# Patient Record
Sex: FEMALE
[De-identification: names, ages, dates, MRNs, and addresses within clinical notes are randomized per-mention and may not be internally consistent; named-entity substitution may affect disease eponyms.]

---

## 2021-07-19 PROBLEM — Z00.00 ENCOUNTER FOR PREVENTIVE HEALTH EXAMINATION: Status: ACTIVE | Noted: 2021-07-19

## 2021-07-28 ENCOUNTER — APPOINTMENT (OUTPATIENT)
Dept: NEUROLOGY | Facility: CLINIC | Age: 86
End: 2021-07-28

## 2021-09-15 ENCOUNTER — NON-APPOINTMENT (OUTPATIENT)
Age: 86
End: 2021-09-15

## 2021-09-15 ENCOUNTER — APPOINTMENT (OUTPATIENT)
Dept: NEUROLOGY | Facility: CLINIC | Age: 86
End: 2021-09-15
Payer: MEDICARE

## 2021-09-15 VITALS
HEART RATE: 70 BPM | DIASTOLIC BLOOD PRESSURE: 74 MMHG | SYSTOLIC BLOOD PRESSURE: 130 MMHG | TEMPERATURE: 97.9 F | HEIGHT: 57 IN | RESPIRATION RATE: 14 BRPM | BODY MASS INDEX: 23.08 KG/M2 | WEIGHT: 107 LBS | OXYGEN SATURATION: 95 %

## 2021-09-15 DIAGNOSIS — Z80.9 FAMILY HISTORY OF MALIGNANT NEOPLASM, UNSPECIFIED: ICD-10-CM

## 2021-09-15 DIAGNOSIS — Z80.0 FAMILY HISTORY OF MALIGNANT NEOPLASM OF DIGESTIVE ORGANS: ICD-10-CM

## 2021-09-15 DIAGNOSIS — Z86.79 PERSONAL HISTORY OF OTHER DISEASES OF THE CIRCULATORY SYSTEM: ICD-10-CM

## 2021-09-15 DIAGNOSIS — Z86.39 PERSONAL HISTORY OF OTHER ENDOCRINE, NUTRITIONAL AND METABOLIC DISEASE: ICD-10-CM

## 2021-09-15 DIAGNOSIS — Z86.73 PERSONAL HISTORY OF TRANSIENT ISCHEMIC ATTACK (TIA), AND CEREBRAL INFARCTION W/OUT RESIDUAL DEFICITS: ICD-10-CM

## 2021-09-15 DIAGNOSIS — I63.9 CEREBRAL INFARCTION, UNSPECIFIED: ICD-10-CM

## 2021-09-15 DIAGNOSIS — R52 PAIN, UNSPECIFIED: ICD-10-CM

## 2021-09-15 DIAGNOSIS — Z87.828 PERSONAL HISTORY OF OTHER (HEALED) PHYSICAL INJURY AND TRAUMA: ICD-10-CM

## 2021-09-15 DIAGNOSIS — Z63.4 DISAPPEARANCE AND DEATH OF FAMILY MEMBER: ICD-10-CM

## 2021-09-15 PROCEDURE — 99204 OFFICE O/P NEW MOD 45 MIN: CPT

## 2021-09-15 RX ORDER — ATORVASTATIN CALCIUM 10 MG/1
10 TABLET, FILM COATED ORAL DAILY
Refills: 0 | Status: ACTIVE | COMMUNITY

## 2021-09-15 RX ORDER — BRIMONIDINE TARTRATE 2 MG/MG
0.2 SOLUTION/ DROPS OPHTHALMIC
Refills: 0 | Status: ACTIVE | COMMUNITY

## 2021-09-15 RX ORDER — LISINOPRIL 40 MG/1
40 TABLET ORAL DAILY
Refills: 0 | Status: ACTIVE | COMMUNITY

## 2021-09-15 RX ORDER — ASPIRIN 81 MG
81 TABLET, DELAYED RELEASE (ENTERIC COATED) ORAL DAILY
Refills: 0 | Status: ACTIVE | COMMUNITY

## 2021-09-15 RX ORDER — LATANOPROST/PF 0.005 %
0.01 DROPS OPHTHALMIC (EYE)
Refills: 0 | Status: ACTIVE | COMMUNITY

## 2021-09-15 SDOH — SOCIAL STABILITY - SOCIAL INSECURITY: DISSAPEARANCE AND DEATH OF FAMILY MEMBER: Z63.4

## 2021-09-15 NOTE — PHYSICAL EXAM
[FreeTextEntry1] : Physical Exam\par Constitutional: no apparent distress\par Psychiatric: normal affect, euthymic, alert and oriented x 3\par MSK: no midline or paraspinal tenderness, no tenderness to palpation of left ribs\par \par Neurologic Exam:\par Mental Status: awake and alert, speech fluent and prosodic with no paraphasic errors\par Cranial Nerves: I: deferred; II: pupils equal round and reactive, visual fields full to confrontation, III, IV, VI: extraocular movements full with no nystagmus; V: facial sensation intact and symmetric; VII: facial power symmetric; VIII: hearing intact to finger rub; IX/X: palate elevates symmetrically, no dysarthria; XI: shoulder shrug symmetric; XII: tongue protrudes midline\par Motor: normal bulk and tone, no orbiting or pronator drift, power 5/5 to confrontation throughout including shoulder abduction, elbow flexion and extension, wrist flexion and extension, hip flexion, knee flexion and extension, no abnormal movements\par Sensation: intact to light touch in distal upper and lower extremities bilaterally\par Coordination: finger-nose-finger intact bilaterally\par Reflexes: 2+ biceps, triceps, brachioradialis, patella, slightly brisker on L\par Gait: narrow base, normal stance and stride, normal arm swing, normal tandem, negative Romberg\par

## 2021-09-15 NOTE — HISTORY OF PRESENT ILLNESS
[FreeTextEntry1] : Presents with daughter for initial visit for stroke and left-sided pain.\par \par 12/31/2017 she was at home, vision went dark, passed out, brought to [place] and was found to have R MCA stroke c/b hemorrhage.  Patient and daughter are unsure of the work up but they were told that it was likely caused by a heart problem.  Loop recorder was placed.  In December 2020 she was getting out of bed, felt lightheaded, fell and hit the back of her head on the floor.  Again went to the ED and had a head CT which showed no hemorrhage and a C spine CT which showed multilevel spondylosis but no acute changes.  Per patient the loop recorder has not shown any events.\par \par A few months after discharge from the second admission she developed severe pain in her left shoulder extending down her arm and into the right side of her torso.  Pain gradually improved and is now absent at rest but  she has a sharp stabbing pain in the left side of her torso when she coughs.  Saw cardiology and was told it was not a heart problem, now concerned it may be a neurologic problem.\par \par

## 2021-09-15 NOTE — CONSULT LETTER
[Dear  ___] : Dear  [unfilled], [Consult Letter:] : I had the pleasure of evaluating your patient, [unfilled]. [Please see my note below.] : Please see my note below. [Consult Closing:] : Thank you very much for allowing me to participate in the care of this patient.  If you have any questions, please do not hesitate to contact me. [Sincerely,] : Sincerely, [___] : [unfilled] [FreeTextEntry2] : Erick Valente MD [FreeTextEntry3] : Leann Delatorre MD

## 2021-09-15 NOTE — REASON FOR VISIT
[Initial Evaluation] : an initial evaluation [Family Member] : family member [FreeTextEntry1] : stroke, left-sided pain

## 2021-09-15 NOTE — ASSESSMENT
[FreeTextEntry1] : 1) Remote R MCA stroke\par -Asked patient and daughter to send copies of prior work up\par -Will obtain carotid ultrasound given recurrent lightheadedness/dizziness and unknown vessel status\par -Continue following with cardiology for loop recorder monitoring\par -Continue aspirin\par -Continue statin, LDL goal < 70\par -Continue antihypertensives\par \par 2) Left torso pain when coughing\par -I cannot think of any potential neurologic cause of this symptom.  Neurologic exam is normal.  Post-stroke pain has been noted with thalamic and other infarcts but this would not fluctuate with increases in intrathoracic pressure.  Recommend PCP evaluation.

## 2021-10-10 ENCOUNTER — APPOINTMENT (OUTPATIENT)
Dept: ULTRASOUND IMAGING | Facility: CLINIC | Age: 86
End: 2021-10-10
Payer: MEDICARE

## 2021-10-10 ENCOUNTER — OUTPATIENT (OUTPATIENT)
Dept: OUTPATIENT SERVICES | Facility: HOSPITAL | Age: 86
LOS: 1 days | End: 2021-10-10

## 2021-10-10 ENCOUNTER — RESULT REVIEW (OUTPATIENT)
Age: 86
End: 2021-10-10

## 2021-10-10 PROCEDURE — 93880 EXTRACRANIAL BILAT STUDY: CPT | Mod: 26

## 2022-05-31 ENCOUNTER — APPOINTMENT (OUTPATIENT)
Dept: ENDOCRINOLOGY | Facility: CLINIC | Age: 87
End: 2022-05-31

## 2022-09-08 ENCOUNTER — APPOINTMENT (OUTPATIENT)
Dept: ENDOCRINOLOGY | Facility: CLINIC | Age: 87
End: 2022-09-08

## 2022-09-08 VITALS
HEIGHT: 55 IN | WEIGHT: 101 LBS | BODY MASS INDEX: 23.37 KG/M2 | SYSTOLIC BLOOD PRESSURE: 148 MMHG | DIASTOLIC BLOOD PRESSURE: 83 MMHG | HEART RATE: 88 BPM

## 2022-09-08 VITALS — HEIGHT: 56 IN

## 2022-09-08 PROCEDURE — 36415 COLL VENOUS BLD VENIPUNCTURE: CPT

## 2022-09-08 PROCEDURE — 99205 OFFICE O/P NEW HI 60 MIN: CPT | Mod: 25

## 2022-09-09 LAB
T3 SERPL-MCNC: 132 NG/DL
T4 FREE SERPL-MCNC: 1.3 NG/DL
T4 SERPL-MCNC: 9.2 UG/DL
TSH SERPL-ACNC: 0.02 UIU/ML

## 2022-09-12 LAB
THYROGLOB AB SERPL-ACNC: <20 IU/ML
THYROPEROXIDASE AB SERPL IA-ACNC: 45.6 IU/ML
TSH RECEPTOR AB: <1.1 IU/L
TSI ACT/NOR SER: <0.1 IU/L

## 2022-09-30 ENCOUNTER — OUTPATIENT (OUTPATIENT)
Dept: OUTPATIENT SERVICES | Facility: HOSPITAL | Age: 87
LOS: 1 days | End: 2022-09-30

## 2022-09-30 ENCOUNTER — APPOINTMENT (OUTPATIENT)
Dept: RADIOLOGY | Facility: CLINIC | Age: 87
End: 2022-09-30

## 2022-10-03 ENCOUNTER — RESULT REVIEW (OUTPATIENT)
Age: 87
End: 2022-10-03

## 2022-10-03 PROCEDURE — 77085 DXA BONE DENSITY AXL VRT FX: CPT | Mod: 26

## 2022-10-03 NOTE — PHYSICAL EXAM
[Alert] : alert [Healthy Appearance] : healthy appearance [No Acute Distress] : no acute distress [Normal Sclera/Conjunctiva] : normal sclera/conjunctiva [Normal Hearing] : hearing was normal [No Neck Mass] : no neck mass was observed [No LAD] : no lymphadenopathy [Supple] : the neck was supple [Thyroid Not Enlarged] : the thyroid was not enlarged [No Respiratory Distress] : no respiratory distress [Clear to Auscultation] : lungs were clear to auscultation bilaterally [Normal S1, S2] : normal S1 and S2 [Normal Rate] : heart rate was normal [Regular Rhythm] : with a regular rhythm [No Stigmata of Cushings Syndrome] : no stigmata of Cushings Syndrome [Normal Gait] : normal gait [Normal Insight/Judgement] : insight and judgment were intact [Kyphosis] : no kyphosis present [Acanthosis Nigricans] : no acanthosis nigricans [de-identified] : approximately 2 cm left midpole nodule, mobile [de-identified] : no moon facies, no supraclavicular fat pads

## 2022-10-03 NOTE — ADDENDUM
[FreeTextEntry1] : Recent laboratory results as below; discussed with Ms. Rendon and her granddaughter, Leyla. TSH 0.02 uIU/mL with T4/T3 within range. TSH receptor antibody and thyroid stimulating immunoglobulin negative. Thyroid peroxidase antibody positive. I recommended starting methimazole 2.5 mg daily for subclinical hyperthyroidism, with plan to repeat thyroid function tests in 12 weeks. We discussed the potential risks and benefits of methimazole, including but not limited to rash, agranulocytosis, and liver failure. The patient will call me immediately with any concerning symptoms, including rash, fever, sore throat, right upper quadrant pain, jaundice, scleral icterus. 9/12/22\par \par Recent bone density testing as below; discussed with Ms. Rendon. Bone density significant for osteoporosis at the femoral neck and vertebral fracture analysis demonstrated a vertebral fracture at L1. We will schedule an appointment to discuss therapy. 10/03/22\par \par Most recent bone mineral density\par Date: August 26, 2022\par Source: [employer]\par Site: [facility]\par \par Site	BMD	T-score	Change previous	Change baseline\par Lumbar spine	0.926	-1.1		\par Femoral neck	0.563	-2.6		\par Total hip                  	0.785	-1.3		\par Distal radius	0.553	-2.4		\par DXA comments: \par Vertebral fracture assessment: T5 to L5 visualized. Vertebral compression fracture noted at L1 (my read).\par \par Impression: I have personally reviewed the DXA images and report. There is osteoporosis at the femoral neck. Vertebral fracture analysis demonstrates a vertebral fracture at L1.

## 2022-10-03 NOTE — ASSESSMENT
[FreeTextEntry1] : Low thyroid stimulating hormone level. TSH may be low due to nonthyroidal illness, thyroiditis, Graves' disease, or toxic nodule. We will send a thyroid function panel; thyroid peroxidase, thyroglobulin, and TSH receptor antibodies now. Further evaluation and management pending results. \par \par Thyroid nodules. We discussed that approximately 95 percent of all thyroid nodules are caused by benign conditions. We discussed that thyroid nodules are very common and less likely to be cancer in older individuals. We discussed the risks and benefits of evaluation for thyroid cancer and morbidity of treatment if diagnosed versus overall prognosis of thyroid cancer. We will defer further thyroid ultrasound screening.\par \par Choking sensation. I referred her to otorhinolaryngology providers within the [facility] system. \par \par Screening for osteoporosis. She has no personal history of fracture and no parental history of hip fracture. We will obtain dual energy X-ray absorptiometry. \par \par Granddaughter, Leyla, [phone]\par \par CC:\par Dr. Vel Lanier, Fax [phone]

## 2022-10-03 NOTE — DATA REVIEWED
[FreeTextEntry1] : \par \par Thyroid ultrasound (March 22, 2022) reviewed and significant for:\par ISTHMUS: Normal size in AP dimension measuring 0.2 cm.\par RIGHT LOBE:  Measures 3.1 x 0.9 x 1.0 cm in sagittal x AP x transverse dimensions.  Volume 1.5 cc.\par ARCHITECTURE: Heterogeneous.. \par COLOR DOPPLER:  Unremarkable.\par RIGHT LOBE NODULES: \par 0.3 x 0.2 x 0.3 cm hypoechoic upper pole nodule previously measured 0.3 x 0.2 x 0.3 cm.\par LEFT LOBE:    Measures 3.5 x 1.6 x 1.9 cm in sagittal x AP x transverse dimensions. Volume 5.3 cc.\par ARCHITECTURE: Heterogeneous.. \par COLOR DOPPLER:  Unremarkable.\par LEFT LOBE NODULES:\par The previously biopsied 2.4 x 1.5 x 1.6 cm heterogeneous mid pole solid nodule with microcalcification measured 2.6 x 1.4 x 1.6 cm on the prior study.\par NECK:\par 2.1 x 0.5 x 1.0 cm abnormal-appearing heterogeneous level 3 lymph node right neck with echogenic foci previously measured 2.4 x 0.5 x 1.1 cm.\par 1.5 x 0.9 x 1.5 cm level 2 lymph node left neck.\par 1.8 x 0.8 x 1.5 cm abnormal appearing level 3 lymph node left neck with echogenic foci previously measured 2.0 x 0.8 x 1.4 cm.\par IMPRESSION:  \par Thyroid nodules without significant interval change.\par Multiple abnormal appearing cervical lymph nodes without significant interval change.

## 2022-10-03 NOTE — HISTORY OF PRESENT ILLNESS
[FreeTextEntry1] : Ms. Rendon is an 89 year-old woman with a history of multiple medical problems including cerebrovascular accident presenting for evaluation of low thyroid stimulating hormone and thyroid nodules. She is accompanied by her daughter in person and granddaughter by telephone. \par \par Low thyroid stimulating hormone. Thyroid nodules.\par She was noted to have low thyroid stimulating hormone levels of 0.01 uIU/mL (normal: 0.40-4.50) in February and March 2022.\par No known previous history of thyroid hormone abnormalities. No history of thyroid medication use.\par Thyroid ultrasound in January 2021 demonstrated a left midpole 2.6 x 1.4 x 1.6 cm nodule with calcification and a right upper pole subcentimeter nodule. Biopsy of the left midpole nodule in February 2021 with benign ([place] II) cytopathology. \par Thyroid ultrasound in March 2022 overall stable from previous.\par No history of radiation exposure.\par Daughter with history of goiter status post thyroidectomy with benign pathology.\par \par She has an occasional choking sensation that causes a nonproductive cough. No dysphagia for liquids or solids. No palpitations. Review of systems otherwise negative.

## 2022-10-06 ENCOUNTER — APPOINTMENT (OUTPATIENT)
Dept: NEPHROLOGY | Facility: CLINIC | Age: 87
End: 2022-10-06

## 2022-10-06 PROCEDURE — 99204 OFFICE O/P NEW MOD 45 MIN: CPT | Mod: 25

## 2022-10-06 PROCEDURE — 36415 COLL VENOUS BLD VENIPUNCTURE: CPT

## 2022-10-06 NOTE — ASSESSMENT
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 However, even if present, MITUL may be an incidental finding and benefits of revascularization do not clearly outweigh risks (based on recent studies).\par * Therapies for kidney disease: blood pressure control; proteinuria reduction with ARB/ACEi; other evidence-based therapies including exercise, a plant-based lower oxalate diet, and 400 mcg folic acid daily\par * Cardiovascular disease prevention: counseling on healthy diet, physical activity, weight loss, alcohol limitation, blood pressure control\par * A counseling information sheet on CKD which they have been instructed to read has been given.\par * The patient has been counseled that chronic kidney disease is a significant condition and regular office follow-up with me (at least every 2 months for now) is important for monitoring and their health, and that it is their responsibility to make a follow-up appointment.\par * The patient has been counseled never to stop taking their medications without discussing it with me or another doctor.\par * The patient has been counseled on avoiding NSAIDs.\par * The patient has been counseled on risk of worsening kidney function and instructed to immediately call and speak with me and go immediately to ER with any severe symptoms, nausea, vomiting, diarrhea, chest pain, or shortness of breath.\par \par # HTN possibly uncontrolled.\par * Continue lisinopril and HCTZ.\par * If BP uncontrolled will consider changing HCTZ to chlorthaldione.\par * The patient's blood pressure was checked with the [employer] HEM-907XL using the SPRINT trial protocol after sitting quietly in an empty room with arm supported, back supported, and feet on the floor for 5 minutes. The average of 3 readings were taken.\par * A counseling information sheet on blood pressure and staying healthy which they have been instructed to read has been given.\par * The patient has been counseled to check their BP at home with an automatic arm cuff, write down the readings, and reach me directly on the phone immediately if they are persistently > 180 systolic or if SBP is less than 100 or if lightheadedness develops. They were counseled to bring in all blood pressure readings and medications next visit.\par * The patient has been counseled that regular office follow-up (at least every 2 months for now)  is important for monitoring and for their health, and that it is their responsibility to make follow up appointments.\par * The patient also has been counseled that they must never stop or change any medications without discussing this with me (or another physician). \par

## 2022-10-06 NOTE — HISTORY OF PRESENT ILLNESS
[FreeTextEntry1] : Kindly referred by Dr. Lanier for CKD. She is here with her granddaughter who was also provided counseling. \par \par Labs from [facility] reviewed. Creatinine 1.27 on 9/15 (eGFR 40). Prevously creatinine has been 1.2 - 1.3 since 2021. * Ultrasound showedd R 8.8 and L atrophic and not well visualized. 12 mg albuminuria and no hematuria on U/A.\par \par HTN treated with HCTZ 12.5 and lisinopril 40. * Doesn’t check BP at home. No lightheadedness. No CP/SOB. Compliant with medications. \par \par

## 2022-10-06 NOTE — PHYSICAL EXAM
[General Appearance - Alert] : alert [General Appearance - In No Acute Distress] : in no acute distress [Neck Appearance] : the appearance of the neck was normal [Neck Cervical Mass (___cm)] : no neck mass was observed [Jugular Venous Distention Increased] : there was no jugular-venous distention [Thyroid Diffuse Enlargement] : the thyroid was not enlarged [Thyroid Nodule] : there were no palpable thyroid nodules [Auscultation Breath Sounds / Voice Sounds] : lungs were clear to auscultation bilaterally [Heart Rate And Rhythm] : heart rate was normal and rhythm regular [Heart Sounds] : normal S1 and S2 [Heart Sounds Gallop] : no gallops [Murmurs] : no murmurs [Heart Sounds Pericardial Friction Rub] : no pericardial rub [Bowel Sounds] : normal bowel sounds [Abdomen Soft] : soft [Abdomen Tenderness] : non-tender [Abdomen Mass (___ Cm)] : no abdominal mass palpated [] : no hepato-splenomegaly [Cervical Lymph Nodes Enlarged Posterior Bilaterally] : posterior cervical [Cervical Lymph Nodes Enlarged Anterior Bilaterally] : anterior cervical [Supraclavicular Lymph Nodes Enlarged Bilaterally] : supraclavicular [Axillary Lymph Nodes Enlarged Bilaterally] : axillary [Femoral Lymph Nodes Enlarged Bilaterally] : femoral [Inguinal Lymph Nodes Enlarged Bilaterally] : inguinal [FreeTextEntry1] : trace ankle edema

## 2022-10-06 NOTE — CONSULT LETTER
[FreeTextEntry1] : Dear Vel,\par \par I had the pleasure of seeing Kecia Rendon in consultation for kidney disease. My note is attached.\par \par Thank you for the opportunity to participate in the care of your patient.\par \par Please call me on my mobile phone at [phone] or in the office at [phone] if you have any questions.\par \par Best regards,\par \par Bradford\par \par Bradford Wynne MD, FACP, FASN\par [address], [place]\par [email or web address]\par Office: [phone]\par Mobile: [phone]

## 2022-10-25 ENCOUNTER — NON-APPOINTMENT (OUTPATIENT)
Age: 87
End: 2022-10-25

## 2022-10-25 ENCOUNTER — LABORATORY RESULT (OUTPATIENT)
Age: 87
End: 2022-10-25

## 2022-10-25 ENCOUNTER — APPOINTMENT (OUTPATIENT)
Dept: ENDOCRINOLOGY | Facility: CLINIC | Age: 87
End: 2022-10-25

## 2022-10-25 VITALS
BODY MASS INDEX: 23.09 KG/M2 | DIASTOLIC BLOOD PRESSURE: 70 MMHG | WEIGHT: 103 LBS | HEART RATE: 85 BPM | SYSTOLIC BLOOD PRESSURE: 158 MMHG

## 2022-10-25 PROCEDURE — 99214 OFFICE O/P EST MOD 30 MIN: CPT | Mod: 25

## 2022-10-25 PROCEDURE — 36415 COLL VENOUS BLD VENIPUNCTURE: CPT

## 2022-10-26 ENCOUNTER — NON-APPOINTMENT (OUTPATIENT)
Age: 87
End: 2022-10-26

## 2022-10-26 LAB
25(OH)D3 SERPL-MCNC: 18.8 NG/ML
ALBUMIN SERPL ELPH-MCNC: 4.2 G/DL
ALP BLD-CCNC: 88 U/L
ALT SERPL-CCNC: 20 U/L
ANION GAP SERPL CALC-SCNC: 14 MMOL/L
AST SERPL-CCNC: 23 U/L
BASOPHILS # BLD AUTO: 0.07 K/UL
BASOPHILS NFR BLD AUTO: 1 %
BILIRUB SERPL-MCNC: 0.3 MG/DL
BUN SERPL-MCNC: 20 MG/DL
CALCIUM SERPL-MCNC: 9.8 MG/DL
CALCIUM SERPL-MCNC: 9.8 MG/DL
CHLORIDE SERPL-SCNC: 107 MMOL/L
CO2 SERPL-SCNC: 23 MMOL/L
CREAT SERPL-MCNC: 1.41 MG/DL
EGFR: 36 ML/MIN/1.73M2
EOSINOPHIL # BLD AUTO: 0.15 K/UL
EOSINOPHIL NFR BLD AUTO: 2.1 %
GLUCOSE SERPL-MCNC: 88 MG/DL
HCT VFR BLD CALC: 38.3 %
HGB BLD-MCNC: 11.6 G/DL
IMM GRANULOCYTES NFR BLD AUTO: 0.1 %
LYMPHOCYTES # BLD AUTO: 1.58 K/UL
LYMPHOCYTES NFR BLD AUTO: 22.1 %
MAGNESIUM SERPL-MCNC: 1.9 MG/DL
MAN DIFF?: NORMAL
MCHC RBC-ENTMCNC: 29.1 PG
MCHC RBC-ENTMCNC: 30.3 GM/DL
MCV RBC AUTO: 96.2 FL
MONOCYTES # BLD AUTO: 0.67 K/UL
MONOCYTES NFR BLD AUTO: 9.4 %
NEUTROPHILS # BLD AUTO: 4.68 K/UL
NEUTROPHILS NFR BLD AUTO: 65.3 %
PARATHYROID HORMONE INTACT: 106 PG/ML
PHOSPHATE SERPL-MCNC: 3.3 MG/DL
PLATELET # BLD AUTO: 201 K/UL
POTASSIUM SERPL-SCNC: 4.3 MMOL/L
PROT SERPL-MCNC: 7.9 G/DL
RBC # BLD: 3.98 M/UL
RBC # FLD: 13.8 %
SODIUM SERPL-SCNC: 144 MMOL/L
T3 SERPL-MCNC: 132 NG/DL
T4 FREE SERPL-MCNC: 1.1 NG/DL
T4 SERPL-MCNC: 7.6 UG/DL
TSH SERPL-ACNC: 0.13 UIU/ML
WBC # FLD AUTO: 7.16 K/UL

## 2022-10-28 LAB — ALP BONE SERPL-MCNC: 20.4 UG/L

## 2022-11-02 ENCOUNTER — NON-APPOINTMENT (OUTPATIENT)
Age: 87
End: 2022-11-02

## 2022-11-03 LAB
ALBUMIN MFR SERPL ELPH: 50.5 %
ALBUMIN SERPL-MCNC: 3.8 G/DL
ALBUMIN/GLOB SERPL: 1 RATIO
ALPHA1 GLOB MFR SERPL ELPH: 4 %
ALPHA1 GLOB SERPL ELPH-MCNC: 0.3 G/DL
ALPHA2 GLOB MFR SERPL ELPH: 9.2 %
ALPHA2 GLOB SERPL ELPH-MCNC: 0.7 G/DL
B-GLOBULIN MFR SERPL ELPH: 14.6 %
B-GLOBULIN SERPL ELPH-MCNC: 1.1 G/DL
GAMMA GLOB FLD ELPH-MCNC: 1.6 G/DL
GAMMA GLOB MFR SERPL ELPH: 21.7 %
INTERPRETATION SERPL IEP-IMP: NORMAL
PROT SERPL-MCNC: 7.6 G/DL
PROT SERPL-MCNC: 7.6 G/DL

## 2022-11-04 LAB
ALBUPE: 28.5 %
ALPHA1UPE: 30.4 %
ALPHA2UPE: 16.8 %
BETAUPE: 13.9 %
GAMMAUPE: 10.4 %
IGA 24H UR QL IFE: NORMAL
KAPPA LC 24H UR QL: NORMAL
PROT PATTERN 24H UR ELPH-IMP: NORMAL
PROT UR-MCNC: 6 MG/DL
PROT UR-MCNC: 6 MG/DL

## 2022-11-04 RX ORDER — DEXTROMETHORPHAN POLISTIREX 30 MG/5 ML
500-1000-40 SUSPENSION, EXTENDED RELEASE 12 HR ORAL
Qty: 90 | Refills: 3 | Status: DISCONTINUED | COMMUNITY
Start: 2022-10-25 | End: 2022-11-04

## 2022-11-04 NOTE — ASSESSMENT
[FreeTextEntry1] : Osteoporosis. She has a history of vertebral fracture. She has no history of prior osteoporosis therapy. We discussed completion of a metabolic evaluation for secondary causes of bone loss. We discussed the potential benefits and risks of antiresorptive osteoporosis therapy, including but not limited to osteonecrosis of the jaw and atypical femoral fracture. She is amenable to therapy with denosumab pending further evaluation and insurance authorization. We discussed that denosumab must be dosed every 6 months due to rebound increase in bone breakdown with abrupt discontinuation of therapy, with transition to bisphosphonate therapy prior to a "drug holiday."\par Metabolic evaluation for secondary causes of osteoporosis\par Calcium 1200 mg daily from diet and supplements (to be taken in divided doses as no more than 500-600 mg can be absorbed at one time); advised increased dietary and/or supplemental calcium\par Vitamin D at least 800 intl units daily or more pending level\par Diet, exercise and fall prevention discussed\par \par Subclinical hyperthyroidism. She has had persistently low TSH values in the setting of thyroid nodules and negative TSH receptor antibody and thyroid stimulating immunoglobulin levels. Her TSH value was less than 0.1 uIU/mL and she met criteria for therapy. She is tolerating methimazole and we will continue pending results. We discussed the potential risks and benefits of methimazole at length, including but not limited to rash, agranulocytosis, and liver failure. The patient will call me immediately with any concerning symptoms, including rash, fever, sore throat, right upper quadrant pain, jaundice, scleral icterus. \par Continue methimazole 2.5 mg daily pending thyroid function tests\par \par Thyroid nodules. We discussed that approximately 95 percent of all thyroid nodules are caused by benign conditions. We discussed that thyroid nodules are very common and less likely to be cancer in older individuals. We discussed the risks and benefits of evaluation for thyroid cancer and morbidity of treatment if diagnosed versus overall prognosis of thyroid cancer. We will defer further thyroid ultrasound screening.\par \par Choking sensation. I referred her to otorhinolaryngology providers within the [facility] system. \par \par Granddaughter, Leyla, [phone]\par \par I reviewed the DXA performed on October 3, 2022 with the patient today.\par I reviewed the laboratories performed on September 8, 2022 with the patient today. \par I counseled the patient regarding calcium and vitamin D intake today.\par I discussed the following osteoporosis therapies: Alendronate, risedronate, ibandronate, zoledronic acid, denosumab\par \par CC:\par Dr. Vel Lanier, Fax [phone]

## 2022-11-04 NOTE — ADDENDUM
[FreeTextEntry1] : Recent laboratory results as below, overall unremarkable for a secondary cause of bone loss; discussed with MsRufus Justice. eGFR around baseline. PTH around goal for eGFR; vitamin D low and recommended ergocalciferol 50,000 intl units every two weeks. TSH rising; T4/T3 within range; recommended continuing current regimen of methimazole. Serum protein electrophoresis with borderline elevated beta globulin, likely due to chronic renal failure; no monoclonal protein. We will proceed with denosumab pending insurance authorization. 11/04/22

## 2022-11-04 NOTE — PHYSICAL EXAM
[Alert] : alert [Healthy Appearance] : healthy appearance [No Acute Distress] : no acute distress [Normal Sclera/Conjunctiva] : normal sclera/conjunctiva [Normal Hearing] : hearing was normal [No Respiratory Distress] : no respiratory distress [No Stigmata of Cushings Syndrome] : no stigmata of Cushings Syndrome [Normal Insight/Judgement] : insight and judgment were intact [Kyphosis] : no kyphosis present [Acanthosis Nigricans] : no acanthosis nigricans [de-identified] : no moon facies, no supraclavicular fat pads [de-identified] : narrow-based and steady with cane

## 2022-11-04 NOTE — DATA REVIEWED
[FreeTextEntry1] : Most recent bone mineral density\par Date: August 26, 2022\par Source: Hologic\par Site: [facility]\par \par Site	BMD	T-score	Change previous	Change baseline\par Lumbar spine	0.926	-1.1		\par Femoral neck	0.563	-2.6		\par Total hip                  	0.785	-1.3		\par Distal radius	0.553	-2.4		\par DXA comments: \par Vertebral fracture assessment: T5 to L5 visualized. Vertebral compression fracture noted at L1 (my read).\par \par Impression: I have personally reviewed the DXA images and report. There is osteoporosis at the femoral neck. Vertebral fracture analysis demonstrates a vertebral fracture at L1. \par \par Thyroid ultrasound (March 22, 2022) reviewed and significant for:\par ISTHMUS: Normal size in AP dimension measuring 0.2 cm.\par RIGHT LOBE:  Measures 3.1 x 0.9 x 1.0 cm in sagittal x AP x transverse dimensions.  Volume 1.5 cc.\par ARCHITECTURE: Heterogeneous.. \par COLOR DOPPLER:  Unremarkable.\par RIGHT LOBE NODULES: \par 0.3 x 0.2 x 0.3 cm hypoechoic upper pole nodule previously measured 0.3 x 0.2 x 0.3 cm.\par LEFT LOBE:    Measures 3.5 x 1.6 x 1.9 cm in sagittal x AP x transverse dimensions. Volume 5.3 cc.\par ARCHITECTURE: Heterogeneous.. \par COLOR DOPPLER:  Unremarkable.\par LEFT LOBE NODULES:\par The previously biopsied 2.4 x 1.5 x 1.6 cm heterogeneous mid pole solid nodule with microcalcification measured 2.6 x 1.4 x 1.6 cm on the prior study.\par NECK:\par 2.1 x 0.5 x 1.0 cm abnormal-appearing heterogeneous level 3 lymph node right neck with echogenic foci previously measured 2.4 x 0.5 x 1.1 cm.\par 1.5 x 0.9 x 1.5 cm level 2 lymph node left neck.\par 1.8 x 0.8 x 1.5 cm abnormal appearing level 3 lymph node left neck with echogenic foci previously measured 2.0 x 0.8 x 1.4 cm.\par IMPRESSION:  \par Thyroid nodules without significant interval change.\par Multiple abnormal appearing cervical lymph nodes without significant interval change.

## 2022-11-04 NOTE — HISTORY OF PRESENT ILLNESS
[FreeTextEntry1] : Ms. Rendon is an 89 year-old woman with a history of multiple medical problems including cerebrovascular accident presenting for follow-up of subclinical hypothyroidism, thyroid nodules, and recently diagnosed osteoporosis. I saw her for an initial visit in September 2022. She is accompanied by her daughter.\par \par Subclinical hypothyroidism. Thyroid nodules.\par She was noted to have low thyroid stimulating hormone levels of 0.01 uIU/mL (normal: 0.40-4.50) in February and March 2022.\par No known previous history of thyroid hormone abnormalities. No history of thyroid medication use.\par Laboratory evaluation from her initial visit demonstrated TSH 0.02 uIU/mL with T4/T3 within range. TSH receptor antibody and thyroid stimulating immunoglobulin negative. Thyroid peroxidase antibody positive. We started methimazole 2.5 mg daily. \par Thyroid ultrasound in January 2021 demonstrated a left midpole 2.6 x 1.4 x 1.6 cm nodule with calcification and a right upper pole subcentimeter nodule. Biopsy of the left midpole nodule in February 2021 with benign ([place]) cytopathology. \par Thyroid ultrasound in March 2022 overall stable from previous.\par No history of radiation exposure.\par Daughter with history of goiter status post thyroidectomy with benign pathology.\par \par Bone History\par Osteoporosis diagnosed in 2022 on routine bone density significant for T-scores of -1.1 at the lumbar spine, -2.6 at the femoral neck, -1.3 at the total hip, and -2.4 at the distal radius\par Fracture history: Morphometric L1 vertebral fracture noted on vertebral fracture assessment in 2022\par Family history: No parental history of hip fracture\par Treatment: None\par \par Falls: No\par Height loss: 3 inches\par Kidney stones: No\par Dental health: Irregular appointments, history of implants, no upcoming procedures planned\par Exercise: Dances, walks\par Dairy intake: 1/2-1 serving daily (milk in coffee, cheese a few days a week, occasional glass of milk)\par Calcium supplements: None\par Multivitamin: None\par Vitamin D supplements: None\par \par Osteoporosis risk factors include: Postmenopausal status,  race, prior fracture, falls, height loss, small thin bones, tobacco use, excessive alcohol, anorexia, family history, vitamin D deficiency, corticosteroid use, seizure medications, malabsorption, hyperparathyroidism, hyperthyroidism.\par NEGATIVE EXCEPT: Postmenopausal status, prior fracture\par \par Interim History \par Laboratory results from last visit as below. TSH 0.02 uIU/mL with T4/T3 within range. TSH receptor antibody and thyroid stimulating immunoglobulin negative. Thyroid peroxidase antibody positive. I recommended starting methimazole 2.5 mg daily for subclinical hyperthyroidism. \par Recent bone density testing as below. Bone density significant for osteoporosis at the femoral neck and vertebral fracture analysis demonstrated a vertebral fracture at L1. \par She has an occasional choking sensation that causes a nonproductive cough; overall improved since starting methimazole. No dysphagia for liquids or solids. No palpitations. Review of systems otherwise negative. \par Medical and surgical history, medications, allergies, social and family history reviewed and updated as needed.

## 2022-12-07 ENCOUNTER — APPOINTMENT (OUTPATIENT)
Dept: NEPHROLOGY | Facility: CLINIC | Age: 87
End: 2022-12-07

## 2022-12-07 VITALS — HEART RATE: 70 BPM | SYSTOLIC BLOOD PRESSURE: 144 MMHG | DIASTOLIC BLOOD PRESSURE: 59 MMHG

## 2022-12-07 PROCEDURE — 99214 OFFICE O/P EST MOD 30 MIN: CPT

## 2022-12-07 NOTE — HISTORY OF PRESENT ILLNESS
[FreeTextEntry1] : Kindly referred by Dr. Lanier for CKD\par \par * CKD stable, creatinine 1.41. * HTN controlled. /60 - 80 at home. No lightheadedness. No CP/SOB. Compliant with medications. \par \par Previous history (06Oct22): Labs from [facility] reviewed. Creatinine 1.27 on 9/15 (eGFR 40). Prevously creatinine has been 1.2 - 1.3 since 2021. * Ultrasound showedd R 8.8 and L atrophic and not well visualized. 12 mg albuminuria and no hematuria on U/A.\par \par HTN treated with HCTZ 12.5 and lisinopril 40. * Doesn’t check BP at home. No lightheadedness. No CP/SOB. Compliant with medications. \par \par

## 2022-12-07 NOTE — CONSULT LETTER
[FreeTextEntry1] : Dear Vel,\par \par I had the pleasure of seeing Kecia Rendon in followup for kidney disease. My note is attached.\par \par Thank you for the opportunity to participate in the care of your patient.\par \par Please call me on my mobile phone at [phone] or in the office at [phone] if you have any questions.\par \par Best regards,\par \par Bradford\par \par Bradford Wynne MD, FACP, FASN\par [address], [place]\par [email or web address]\par Office: [phone]\par Mobile: [phone]

## 2022-12-07 NOTE — PHYSICAL EXAM
[General Appearance - Alert] : alert [General Appearance - In No Acute Distress] : in no acute distress [Neck Appearance] : the appearance of the neck was normal [Neck Cervical Mass (___cm)] : no neck mass was observed [Jugular Venous Distention Increased] : there was no jugular-venous distention [Thyroid Diffuse Enlargement] : the thyroid was not enlarged [Thyroid Nodule] : there were no palpable thyroid nodules [Auscultation Breath Sounds / Voice Sounds] : lungs were clear to auscultation bilaterally [Heart Rate And Rhythm] : heart rate was normal and rhythm regular [Heart Sounds] : normal S1 and S2 [Heart Sounds Gallop] : no gallops [Murmurs] : no murmurs [Heart Sounds Pericardial Friction Rub] : no pericardial rub [Bowel Sounds] : normal bowel sounds [Abdomen Soft] : soft [Abdomen Tenderness] : non-tender [] : no hepato-splenomegaly [Abdomen Mass (___ Cm)] : no abdominal mass palpated [Cervical Lymph Nodes Enlarged Posterior Bilaterally] : posterior cervical [Cervical Lymph Nodes Enlarged Anterior Bilaterally] : anterior cervical [Supraclavicular Lymph Nodes Enlarged Bilaterally] : supraclavicular [Axillary Lymph Nodes Enlarged Bilaterally] : axillary [Femoral Lymph Nodes Enlarged Bilaterally] : femoral [Inguinal Lymph Nodes Enlarged Bilaterally] : inguinal [FreeTextEntry1] : trace ankle edema

## 2022-12-07 NOTE — ASSESSMENT
[FreeTextEntry1] : # CKD stage 3 c/w aging and nephrosclerosis, atrophic kidneys.\par * Absolute kidney benefits/risks of SGLT2i in this clinical situation are uncertain.\par * Atherosclerotic renal artery stenosis is possible. However, even if present, MITUL may be an incidental finding and benefits of revascularization do not clearly outweigh risks (based on recent studies).\par * Therapies for kidney disease: blood pressure control; proteinuria reduction with ARB/ACEi; other evidence-based therapies including exercise, a plant-based lower oxalate diet, and 400 mcg folic acid daily\par * Cardiovascular disease prevention: counseling on healthy diet, physical activity, weight loss, alcohol limitation, blood pressure control\par * A counseling information sheet on CKD which they have been instructed to read has been given.\par * The patient has been counseled that chronic kidney disease is a significant condition and regular office follow-up with me (at least every 3 months for now) is important for monitoring and their health, and that it is their responsibility to make a follow-up appointment.\par * The patient has been counseled never to stop taking their medications without discussing it with me or another doctor.\par * The patient has been counseled on avoiding NSAIDs.\par * The patient has been counseled on risk of worsening kidney function and instructed to immediately call and speak with me and go immediately to ER with any severe symptoms, nausea, vomiting, diarrhea, chest pain, or shortness of breath.\par \par # HTN controlled, white coat effect.\par * Cont lisinopril and HCTZ.\par * The patient's blood pressure was checked with the [employer] HEM-907XL using the SPRINT trial protocol after sitting quietly in an empty room with arm supported, back supported, and feet on the floor for 5 minutes. The average of 3 readings were taken.\par * A counseling information sheet on blood pressure and staying healthy has been given (which they have been instructed to read).\par * The patient has been counseled to check their BP at home with an automatic arm cuff, write down the readings, and reach me directly on the phone immediately if they are persistently > 180 systolic or if SBP is less than 100 or if lightheadedness develops. They were counseled to bring in all blood pressure readings and medications next visit.\par * The patient has been counseled that regular office follow-up (at least every 3 months for now)  is important for monitoring and for their health, and that it is their responsibility to make follow up appointments.\par * The patient also has been counseled that they must never stop or change any medications without discussing this with me (or another physician). \par \par # Osteoporosis.\par * Cont HCTZ.\par * Follow up with Dr. Mcbride.

## 2022-12-15 ENCOUNTER — APPOINTMENT (OUTPATIENT)
Dept: ENDOCRINOLOGY | Facility: CLINIC | Age: 87
End: 2022-12-15

## 2022-12-15 PROCEDURE — 96401 CHEMO ANTI-NEOPL SQ/IM: CPT

## 2022-12-15 RX ORDER — DENOSUMAB 60 MG/ML
60 INJECTION SUBCUTANEOUS
Qty: 1 | Refills: 0 | Status: COMPLETED | OUTPATIENT
Start: 2022-12-15

## 2022-12-15 RX ADMIN — DENOSUMAB 60 MG/ML: 60 INJECTION SUBCUTANEOUS at 00:00

## 2022-12-15 NOTE — ASSESSMENT
[FreeTextEntry1] : 89 year old female with osteoporosis presents for first Prolia injection. Accompanied by daughter. Pt of Dr Mcbride.\par She is taking Vit D 50K every 2 weeks and calcium with vitamin D supplement - 600mg ca and unknown Vit d dose - a tab per day. Also gets calcium in diet.\par Reviewed common SE of Prolia and potential adverse effects and when to notify the office, specifically new and sudden hip, thigh, or jaw pain.\par \par Prolia 60mg SC administered to left arm. Pt tolerated well. Buy and bill in office. Advised that office will call in 5 months to schedule next injection. \par \par NDC 16279-672-23\par  [id number]\par Lot# [id number]\par Exp. 4/30/2025

## 2023-01-17 ENCOUNTER — APPOINTMENT (OUTPATIENT)
Dept: ENDOCRINOLOGY | Facility: CLINIC | Age: 88
End: 2023-01-17
Payer: MEDICARE

## 2023-01-17 VITALS
DIASTOLIC BLOOD PRESSURE: 70 MMHG | SYSTOLIC BLOOD PRESSURE: 149 MMHG | HEART RATE: 92 BPM | WEIGHT: 105 LBS | BODY MASS INDEX: 23.54 KG/M2

## 2023-01-17 PROCEDURE — 99214 OFFICE O/P EST MOD 30 MIN: CPT

## 2023-01-18 LAB
ALBUMIN SERPL ELPH-MCNC: 4.1 G/DL
ALP BLD-CCNC: 65 U/L
ALT SERPL-CCNC: 17 U/L
ANION GAP SERPL CALC-SCNC: 11 MMOL/L
AST SERPL-CCNC: 23 U/L
BILIRUB SERPL-MCNC: <0.2 MG/DL
BUN SERPL-MCNC: 19 MG/DL
CALCIUM SERPL-MCNC: 10.2 MG/DL
CHLORIDE SERPL-SCNC: 108 MMOL/L
CO2 SERPL-SCNC: 24 MMOL/L
CREAT SERPL-MCNC: 1.41 MG/DL
EGFR: 36 ML/MIN/1.73M2
GLUCOSE SERPL-MCNC: 92 MG/DL
POTASSIUM SERPL-SCNC: 4 MMOL/L
PROT SERPL-MCNC: 7.2 G/DL
SODIUM SERPL-SCNC: 144 MMOL/L
T3 SERPL-MCNC: 117 NG/DL
T4 FREE SERPL-MCNC: 0.9 NG/DL
T4 SERPL-MCNC: 6.7 UG/DL
TSH SERPL-ACNC: 0.5 UIU/ML

## 2023-01-18 NOTE — PHYSICAL EXAM
[Alert] : alert [Healthy Appearance] : healthy appearance [No Acute Distress] : no acute distress [Normal Sclera/Conjunctiva] : normal sclera/conjunctiva [Normal Hearing] : hearing was normal [No Respiratory Distress] : no respiratory distress [No Stigmata of Cushings Syndrome] : no stigmata of Cushings Syndrome [Normal Insight/Judgement] : insight and judgment were intact [Kyphosis] : no kyphosis present [Acanthosis Nigricans] : no acanthosis nigricans [de-identified] : no moon facies, no supraclavicular fat pads [de-identified] : narrow-based and steady with cane

## 2023-01-18 NOTE — HISTORY OF PRESENT ILLNESS
[FreeTextEntry1] : Ms. Rendon is an 89 year-old woman with a history of multiple medical problems including cerebrovascular accident presenting for follow-up of subclinical hypothyroidism, thyroid nodules, and recently diagnosed osteoporosis. I saw her for an initial visit in September 2022 and last in October. She is accompanied by her daughter.\par \par Subclinical hypothyroidism. Thyroid nodules.\par She was noted to have low thyroid stimulating hormone levels of 0.01 uIU/mL (normal: 0.40-4.50) in February and March 2022.\par No known previous history of thyroid hormone abnormalities. No history of thyroid medication use.\par Laboratory evaluation from her initial visit demonstrated TSH 0.02 uIU/mL with T4/T3 within range. TSH receptor antibody and thyroid stimulating immunoglobulin negative. Thyroid peroxidase antibody positive. We started methimazole 2.5 mg daily. \par Thyroid ultrasound in January 2021 demonstrated a left midpole 2.6 x 1.4 x 1.6 cm nodule with calcification and a right upper pole subcentimeter nodule. Biopsy of the left midpole nodule in February 2021 with benign ([place] [facility]) cytopathology. \par Thyroid ultrasound in March 2022 overall stable from previous.\par No history of radiation exposure.\par Daughter with history of goiter status post thyroidectomy with benign pathology.\par \par Bone History\par Osteoporosis diagnosed in 2022 on routine bone density significant for T-scores of -1.1 at the lumbar spine, -2.6 at the femoral neck, -1.3 at the total hip, and -2.4 at the distal radius\par Fracture history: Morphometric L1 vertebral fracture noted on vertebral fracture assessment in 2022\par Family history: No parental history of hip fracture\par Treatment: None\par \par Falls: No\par Height loss: 3 inches\par Kidney stones: No\par Dental health: Irregular appointments, history of implants, no upcoming procedures planned\par Exercise: Dances, walks\par Dairy intake: 1/2-1 serving daily (milk in coffee, cheese a few days a week, occasional glass of milk)\par Calcium supplements: 600 mg daily\par Multivitamin: None\par Vitamin D supplements: Ergocalciferol 50,000 intl units every two weeks\par \par Osteoporosis risk factors include: Postmenopausal status,  race, prior fracture, falls, height loss, small thin bones, tobacco use, excessive alcohol, anorexia, family history, vitamin D deficiency, corticosteroid use, seizure medications, malabsorption, hyperparathyroidism, hyperthyroidism.\par NEGATIVE EXCEPT: Postmenopausal status, prior fracture\par \par Interim History \par Laboratory results from last visit as below. Recent laboratory results as below, overall unremarkable for a secondary cause of bone loss. eGFR around baseline. PTH around goal for eGFR; vitamin D low and recommended ergocalciferol 50,000 intl units every two weeks. TSH rising; T4/T3 within range; recommended continuing current regimen of methimazole. Serum protein electrophoresis with borderline elevated beta globulin, likely due to chronic renal failure; no monoclonal protein. \par She received denosumab 60 mg SC in December 2022 and tolerated well.\par She has a rare choking sensation that causes a nonproductive cough; overall improved since starting methimazole. No dysphagia for liquids or solids. No palpitations. Review of systems otherwise negative. \par Medical and surgical history, medications, allergies, social and family history reviewed and updated as needed.

## 2023-01-18 NOTE — DATA REVIEWED
[FreeTextEntry1] : Most recent bone mineral density\par Date: August 26, 2022\par Source: Hologic\par Site: [facility]\par \par Site	BMD	T-score	Change previous	Change baseline\par Lumbar spine	0.926	-1.1		\par Femoral neck	0.563	-2.6		\par Total hip                  	0.785	-1.3		\par Distal radius	0.553	-2.4		\par DXA comments: \par Vertebral fracture assessment: T5 to L5 visualized. Vertebral compression fracture noted at L1 (my read).\par \par Impression: I have personally reviewed the DXA images and report. There is osteoporosis at the femoral neck. Vertebral fracture analysis demonstrates a vertebral fracture at L1. \par \par Thyroid ultrasound (March 22, 2022) reviewed and significant for:\par ISTHMUS: Normal size in AP dimension measuring 0.2 cm.\par RIGHT LOBE:  Measures 3.1 x 0.9 x 1.0 cm in sagittal x AP x transverse dimensions.  Volume 1.5 cc.\par ARCHITECTURE: Heterogeneous.. \par COLOR DOPPLER:  Unremarkable.\par RIGHT LOBE NODULES: \par 0.3 x 0.2 x 0.3 cm hypoechoic upper pole nodule previously measured 0.3 x 0.2 x 0.3 cm.\par LEFT LOBE:    Measures 3.5 x 1.6 x 1.9 cm in sagittal x AP x transverse dimensions. Volume 5.3 cc.\par ARCHITECTURE: Heterogeneous.. \par COLOR DOPPLER:  Unremarkable.\par LEFT LOBE NODULES:\par The previously biopsied 2.4 x 1.5 x 1.6 cm heterogeneous mid pole solid nodule with microcalcification measured 2.6 x 1.4 x 1.6 cm on the prior study.\par NECK:\par 2.1 x 0.5 x 1.0 cm abnormal-appearing heterogeneous level 3 lymph node right neck with echogenic foci previously measured 2.4 x 0.5 x 1.1 cm.\par 1.5 x 0.9 x 1.5 cm level 2 lymph node left neck.\par 1.8 x 0.8 x 1.5 cm abnormal appearing level 3 lymph node left neck with echogenic foci previously measured 2.0 x 0.8 x 1.4 cm.\par IMPRESSION:  \par Thyroid nodules without significant interval change.\par Multiple abnormal appearing cervical lymph nodes without significant interval change.

## 2023-01-18 NOTE — ADDENDUM
[FreeTextEntry1] : Recent laboratory results as below; discussed with her granddaughter, Leyla per Ms. Rendon's request. Thyroid function tests within the normal range; recommend continuing her current regimen of methimazole. eGFR around baseline. 1/18/23

## 2023-01-18 NOTE — ASSESSMENT
[FreeTextEntry1] : Subclinical hyperthyroidism. She has had persistently low TSH values in the setting of thyroid nodules and negative TSH receptor antibody and thyroid stimulating immunoglobulin levels. Her TSH value was less than 0.1 uIU/mL and she met criteria for therapy. She is tolerating methimazole and we will continue pending results. We discussed the potential risks and benefits of methimazole at length, including but not limited to rash, agranulocytosis, and liver failure. The patient has been advised to call me immediately with any concerning symptoms, including rash, fever, sore throat, right upper quadrant pain, jaundice, scleral icterus. \par Continue methimazole 2.5 mg daily pending thyroid function tests\par \par Osteoporosis. She has a history of vertebral fracture. She has received denosumab from December 2022 to present. 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 We discussed the potential benefits and risks of antiresorptive osteoporosis therapy, including but not limited to osteonecrosis of the jaw and atypical femoral fracture. She is tolerating denosumab and we will continue. We discussed that denosumab must be dosed every 6 months due to rebound increase in bone breakdown with abrupt discontinuation of therapy, with transition to bisphosphonate therapy prior to a "drug holiday."\par Continue denosumab 60 mg SC every 6 months; next dose due in June 2023\par Calcium 0642-5046 mg daily from diet and supplements (to be taken in divided doses as no more than 500-600 mg can be absorbed at one time)\par Continue current vitamin D regimen\par Diet, exercise and fall prevention discussed\par \par Thyroid nodules. We discussed that approximately 95 percent of all thyroid nodules are caused by benign conditions. We discussed that thyroid nodules are very common and less likely to be cancer in older individuals. We discussed the risks and benefits of evaluation for thyroid cancer and morbidity of treatment if diagnosed versus overall prognosis of thyroid cancer. We will defer further thyroid ultrasound screening.\par \par Granddaughter, Leyla, [phone]\par \par I reviewed the DXA performed on October 3, 2022 with the patient today.\par I reviewed the laboratories performed on October 25, 2022 with the patient today. \par I counseled the patient regarding calcium and vitamin D intake today.\par I discussed the following osteoporosis therapies: Denosumab\par \par CC:\par Dr. Vel Lanier, Fax [phone]

## 2023-03-07 ENCOUNTER — APPOINTMENT (OUTPATIENT)
Dept: NEPHROLOGY | Facility: CLINIC | Age: 88
End: 2023-03-07
Payer: MEDICARE

## 2023-03-07 VITALS — SYSTOLIC BLOOD PRESSURE: 164 MMHG | HEART RATE: 90 BPM | DIASTOLIC BLOOD PRESSURE: 71 MMHG

## 2023-03-07 PROCEDURE — 99214 OFFICE O/P EST MOD 30 MIN: CPT | Mod: 25

## 2023-03-07 PROCEDURE — 36415 COLL VENOUS BLD VENIPUNCTURE: CPT

## 2023-03-07 NOTE — ASSESSMENT
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 \par * Absolute kidney benefits/risks of SGLT2i in this clinical situation are uncertain.\par * Atherosclerotic renal artery stenosis is possible. However, even if present, MITUL may be an incidental finding and benefits of revascularization do not clearly outweigh risks (based on recent studies).\par * Therapies for kidney disease: blood pressure control; proteinuria reduction with ARB/ACEi; other evidence-based therapies including exercise, a plant-based lower oxalate diet, and 400 mcg folic acid daily\par * Cardiovascular disease prevention: counseling on healthy diet, physical activity, weight loss, alcohol limitation, blood pressure control\par * A counseling information sheet on CKD which they have been instructed to read has been given.\par * The patient has been counseled that chronic kidney disease is a significant condition and regular office follow-up with me (at least every 3 months for now) is important for monitoring and their health, and that it is their responsibility to make a follow-up appointment.\par * The patient has been counseled never to stop taking their medications without discussing it with me or another doctor.\par * The patient has been counseled on avoiding NSAIDs.\par * The patient has been counseled on risk of worsening kidney function and instructed to immediately call and speak with me and go immediately to ER with any severe symptoms, nausea, vomiting, diarrhea, chest pain, or shortness of breath.\par \par # HTN controlled, white coat effect.\par * Cont lisinopril and HCTZ.\par * The patient's blood pressure was checked with the [employer] HEM-907XL using the SPRINT trial protocol after sitting quietly in an empty room with arm supported, back supported, and feet on the floor for 5 minutes. The average of 3 readings were taken.\par * A counseling information sheet on blood pressure and staying healthy has been given (which they have been instructed to read).\par * The patient has been counseled to check their BP at home with an automatic arm cuff, write down the readings, and reach me directly on the phone immediately if they are persistently > 180 systolic or if SBP is less than 100 or if lightheadedness develops. They were counseled to bring in all blood pressure readings and medications next visit.\par * The patient has been counseled that regular office follow-up (at least every 3 months for now) is important for monitoring and for their health, and that it is their responsibility to make follow up appointments.\par * The patient also has been counseled that they must never stop or change any medications without discussing this with me (or another physician). \par \par # Osteoporosis.\par * Cont HCTZ.\par * Follow up with Dr. Mcbride. \par \par # Fludi overload improved. \par * Low salt diet. \par

## 2023-03-07 NOTE — HISTORY OF PRESENT ILLNESS
[FreeTextEntry1] : Kindly referred by Dr. Lanier for CKD\par \par * CKD stable, creaitnine 1.41 in January 2023. * HTN controlled.  - 140s at home. No lightheadedness. No CP/SOB. Compliant with medications. * Ultrasound showedd R 8.8 and L atrophic and not well visualized. \par \par Previous history (07Dec22): * CKD stable, creatinine 1.41. * HTN controlled. /60 - 80 at home. No lightheadedness. No CP/SOB. Compliant with medications. \par \par Previous history (06Oct22): Labs from [facility] reviewed. Creatinine 1.27 on 9/15 (eGFR 40). Prevously creatinine has been 1.2 - 1.3 since 2021. * Ultrasound showedd R 8.8 and L atrophic and not well visualized. 12 mg albuminuria and no hematuria on U/A.\par \par HTN treated with HCTZ 12.5 and lisinopril 40. * Doesn’t check BP at home. No lightheadedness. No CP/SOB. Compliant with medications. \par \par

## 2023-03-08 LAB
ALBUMIN SERPL ELPH-MCNC: 4.2 G/DL
ALP BLD-CCNC: 53 U/L
ALT SERPL-CCNC: 20 U/L
ANION GAP SERPL CALC-SCNC: 15 MMOL/L
AST SERPL-CCNC: 29 U/L
BASOPHILS # BLD AUTO: 0.06 K/UL
BASOPHILS NFR BLD AUTO: 0.9 %
BILIRUB SERPL-MCNC: 0.2 MG/DL
BUN SERPL-MCNC: 20 MG/DL
CALCIUM SERPL-MCNC: 10.2 MG/DL
CHLORIDE SERPL-SCNC: 107 MMOL/L
CO2 SERPL-SCNC: 20 MMOL/L
CREAT SERPL-MCNC: 1.4 MG/DL
CYSTATIN C SERPL-MCNC: 1.87 MG/L
EGFR: 36 ML/MIN/1.73M2
EOSINOPHIL # BLD AUTO: 0.18 K/UL
EOSINOPHIL NFR BLD AUTO: 2.6 %
GFR/BSA.PRED SERPLBLD CYS-BASED-ARV: 28 ML/MIN/1.73M2
GLUCOSE SERPL-MCNC: 93 MG/DL
HCT VFR BLD CALC: 33.7 %
HGB BLD-MCNC: 10.7 G/DL
IMM GRANULOCYTES NFR BLD AUTO: 0.1 %
LYMPHOCYTES # BLD AUTO: 2.23 K/UL
LYMPHOCYTES NFR BLD AUTO: 32 %
MAGNESIUM SERPL-MCNC: 2 MG/DL
MAN DIFF?: NORMAL
MCHC RBC-ENTMCNC: 30.4 PG
MCHC RBC-ENTMCNC: 31.8 GM/DL
MCV RBC AUTO: 95.7 FL
MONOCYTES # BLD AUTO: 0.82 K/UL
MONOCYTES NFR BLD AUTO: 11.8 %
NEUTROPHILS # BLD AUTO: 3.66 K/UL
NEUTROPHILS NFR BLD AUTO: 52.6 %
PHOSPHATE SERPL-MCNC: 3 MG/DL
PLATELET # BLD AUTO: 167 K/UL
POTASSIUM SERPL-SCNC: 4.3 MMOL/L
PROT SERPL-MCNC: 7.2 G/DL
RBC # BLD: 3.52 M/UL
RBC # FLD: 14 %
SODIUM SERPL-SCNC: 143 MMOL/L
WBC # FLD AUTO: 6.96 K/UL

## 2023-05-16 ENCOUNTER — NON-APPOINTMENT (OUTPATIENT)
Age: 88
End: 2023-05-16

## 2023-05-18 ENCOUNTER — NON-APPOINTMENT (OUTPATIENT)
Age: 88
End: 2023-05-18

## 2023-06-13 ENCOUNTER — APPOINTMENT (OUTPATIENT)
Dept: OTOLARYNGOLOGY | Facility: CLINIC | Age: 88
End: 2023-06-13
Payer: MEDICARE

## 2023-06-13 DIAGNOSIS — R09.89 OTHER SPECIFIED SYMPTOMS AND SIGNS INVOLVING THE CIRCULATORY AND RESPIRATORY SYSTEMS: ICD-10-CM

## 2023-06-13 PROCEDURE — 31575 DIAGNOSTIC LARYNGOSCOPY: CPT

## 2023-06-13 PROCEDURE — 99024 POSTOP FOLLOW-UP VISIT: CPT

## 2023-06-13 NOTE — CONSULT LETTER
[Dear  ___] : Dear  [unfilled], [Courtesy Letter:] : I had the pleasure of seeing your patient, [unfilled], in my office today. [Consult Closing:] : Thank you very much for allowing me to participate in the care of this patient.  If you have any questions, please do not hesitate to contact me. [Sincerely,] : Sincerely, [FreeTextEntry3] : Ramin Dunbar MD\par Department of Otolaryngology - Head and Neck Surgery\par [facility]

## 2023-06-13 NOTE — PHYSICAL EXAM
[de-identified] : soft, flat, no masses/lesions [Midline] : trachea located in midline position [Laryngoscopy Performed] : laryngoscopy was performed, see procedure section for findings [de-identified] : posterior opx widely patent, no drainage [Normal] : no rashes

## 2023-06-13 NOTE — HISTORY OF PRESENT ILLNESS
[de-identified] : 89F here for initial evaluation.\par \par For the past few months, pt c/o throat discomfort w feeling something is stuck there w constant throat clearing/coughing. Since starting, the sx have noticeably improved. They still fluctuate in severity without triggers but are overall much better. There is no difficulty eating, breathing, swallowing or talking; there is no weight loss.\par \par ROS otherwise unremarkable.

## 2023-06-13 NOTE — PROCEDURE
[FreeTextEntry3] : Fiberoptic Laryngoscopy:\par upper airway widely patent\par TVF symmetric and mobile\par no masses/lesions

## 2023-06-13 NOTE — ASSESSMENT
[FreeTextEntry1] : 89F here for initial evaluation. For the past few months, pt c/o throat discomfort w feeling something is stuck there w constant throat clearing/coughing. Since starting, the sx have noticeably improved. They still fluctuate in severity without triggers but are overall much better. There is no difficulty eating, breathing, swallowing or talking; there is no weight loss. Complete and comprehensive head and neck exam, including fiberoptic laryngoscopy, is unremarkable.\par Despite complaints, her exam is nonfocal and normal. I would favor LPR and diet/lifestyle measures reviewed. Pt reassured. RTO should sx worsen or worrisome signs develop.\par

## 2023-06-16 ENCOUNTER — APPOINTMENT (OUTPATIENT)
Dept: ENDOCRINOLOGY | Facility: CLINIC | Age: 88
End: 2023-06-16
Payer: MEDICARE

## 2023-06-16 VITALS
WEIGHT: 104 LBS | BODY MASS INDEX: 23.32 KG/M2 | HEART RATE: 67 BPM | SYSTOLIC BLOOD PRESSURE: 152 MMHG | DIASTOLIC BLOOD PRESSURE: 71 MMHG

## 2023-06-16 PROCEDURE — 96401 CHEMO ANTI-NEOPL SQ/IM: CPT

## 2023-06-16 PROCEDURE — 99214 OFFICE O/P EST MOD 30 MIN: CPT | Mod: 25

## 2023-06-16 RX ORDER — DENOSUMAB 60 MG/ML
60 INJECTION SUBCUTANEOUS
Qty: 1 | Refills: 0 | Status: COMPLETED | OUTPATIENT
Start: 2023-06-16

## 2023-06-16 RX ADMIN — DENOSUMAB 0 MG/ML: 60 INJECTION SUBCUTANEOUS at 00:00

## 2023-06-18 LAB
T3 SERPL-MCNC: 125 NG/DL
T4 FREE SERPL-MCNC: 0.9 NG/DL
T4 SERPL-MCNC: 6.5 UG/DL
TSH SERPL-ACNC: 1.12 UIU/ML

## 2023-06-18 NOTE — DATA REVIEWED
[FreeTextEntry1] : Most recent bone mineral density\par Date: August 26, 2022\par Source: Hologic\par Site: [facility]\par \par Site	BMD	T-score	Change previous	Change baseline\par Lumbar spine	0.926	-1.1		\par Femoral neck	0.563	-2.6		\par Total hip                  	0.785	-1.3		\par Distal radius	0.553	-2.4		\par DXA comments: \par Vertebral fracture assessment: T5 to L5 visualized. Vertebral compression fracture noted at L1 (my read).\par \par Impression: I have personally reviewed the DXA images and report. There is osteoporosis at the femoral neck. Vertebral fracture analysis demonstrates a vertebral fracture at L1. \par \par Thyroid ultrasound (March 22, 2022) reviewed and significant for:\par ISTHMUS: Normal size in AP dimension measuring 0.2 cm.\par RIGHT LOBE:  Measures 3.1 x 0.9 x 1.0 cm in sagittal x AP x transverse dimensions.  Volume 1.5 cc.\par ARCHITECTURE: Heterogeneous.. \par COLOR DOPPLER:  Unremarkable.\par RIGHT LOBE NODULES: \par 0.3 x 0.2 x 0.3 cm hypoechoic upper pole nodule previously measured 0.3 x 0.2 x 0.3 cm.\par LEFT LOBE:    Measures 3.5 x 1.6 x 1.9 cm in sagittal x AP x transverse dimensions. Volume 5.3 cc.\par ARCHITECTURE: Heterogeneous.. \par COLOR DOPPLER:  Unremarkable.\par LEFT LOBE NODULES:\par The previously biopsied 2.4 x 1.5 x 1.6 cm heterogeneous mid pole solid nodule with microcalcification measured 2.6 x 1.4 x 1.6 cm on the prior study.\par NECK:\par 2.1 x 0.5 x 1.0 cm abnormal-appearing heterogeneous level 3 lymph node right neck with echogenic foci previously measured 2.4 x 0.5 x 1.1 cm.\par 1.5 x 0.9 x 1.5 cm level 2 lymph node left neck.\par 1.8 x 0.8 x 1.5 cm abnormal appearing level 3 lymph node left neck with echogenic foci previously measured 2.0 x 0.8 x 1.4 cm.\par IMPRESSION:  \par Thyroid nodules without significant interval change.\par Multiple abnormal appearing cervical lymph nodes without significant interval change.

## 2023-06-18 NOTE — HISTORY OF PRESENT ILLNESS
[FreeTextEntry1] : Ms. Rendon is an 89 year-old woman with a history of multiple medical problems including cerebrovascular accident presenting for follow-up of subclinical hypothyroidism, thyroid nodules, and recently diagnosed osteoporosis. I saw her for an initial visit in September 2022 and last in January 2023. She is accompanied by her daughter.\par \par Subclinical hypothyroidism. Thyroid nodules.\par She was noted to have low thyroid stimulating hormone levels of 0.01 uIU/mL (normal: 0.40-4.50) in February and March 2022.\par No known previous history of thyroid hormone abnormalities. No history of thyroid medication use.\par Laboratory evaluation from her initial visit demonstrated TSH 0.02 uIU/mL with T4/T3 within range. TSH receptor antibody and thyroid stimulating immunoglobulin negative. Thyroid peroxidase antibody positive. We started methimazole 2.5 mg daily. \par Thyroid ultrasound in January 2021 demonstrated a left midpole 2.6 x 1.4 x 1.6 cm nodule with calcification and a right upper pole subcentimeter nodule. Biopsy of the left midpole nodule in February 2021 with benign (Kinsman II) cytopathology. \par Thyroid ultrasound in March 2022 overall stable from previous.\par No history of radiation exposure.\par Daughter with history of goiter status post thyroidectomy with benign pathology.\par \par Bone History\par Osteoporosis diagnosed in 2022 on routine bone density significant for T-scores of -1.1 at the lumbar spine, -2.6 at the femoral neck, -1.3 at the total hip, and -2.4 at the distal radius\par Fracture history: Morphometric L1 vertebral fracture noted on vertebral fracture assessment in 2022\par Family history: No parental history of hip fracture\par Treatment: Denosumab 60 mg SC in December 2022\par \par Falls: None recent\par Height loss: 3 inches\par Kidney stones: No\par Dental health: Irregular appointments, history of implants, no upcoming procedures planned\par Exercise: Dances, walks\par Dairy intake: 1/2-1 serving daily (milk in coffee, cheese a few days a week, occasional glass of milk)\par Calcium supplements: 600 mg daily\par Multivitamin: None\par Vitamin D supplements: Ergocalciferol 50,000 intl units every two weeks\par \par Osteoporosis risk factors include: Postmenopausal status,  race, prior fracture, falls, height loss, small thin bones, tobacco use, excessive alcohol, anorexia, family history, vitamin D deficiency, corticosteroid use, seizure medications, malabsorption, hyperparathyroidism, hyperthyroidism.\par NEGATIVE EXCEPT: Postmenopausal status, prior fracture\par \par Interim History \par Laboratory results from last visit as below. Thyroid function tests within the normal range; recommended continuing her current regimen of methimazole. eGFR around baseline. \par She has a rare choking sensation that causes a nonproductive cough; overall improved since starting methimazole. She was evaluated by Dr. Ramin Dunbar; note reviewed.\par She has seen Dr. Bradford Wynne; note reviewed.\par She feels well today. No dysphagia for liquids or solids. No palpitations. Review of systems otherwise negative. \par Medical and surgical history, medications, allergies, social and family history reviewed and updated as needed.

## 2023-06-18 NOTE — ADDENDUM
[FreeTextEntry1] : Procedure Note: Denosumab 60 mg SC administered into left deltoid area. Lucía Mcbride MD\par \par Recent laboratory results as below. TSH, T4/T3 within the normal range and recommend continuation of methimazole 2.5 mg daily. 6/18/23

## 2023-06-18 NOTE — PHYSICAL EXAM
[Alert] : alert [Healthy Appearance] : healthy appearance [No Acute Distress] : no acute distress [Normal Sclera/Conjunctiva] : normal sclera/conjunctiva [Normal Hearing] : hearing was normal [No Respiratory Distress] : no respiratory distress [No Stigmata of Cushings Syndrome] : no stigmata of Cushings Syndrome [Normal Insight/Judgement] : insight and judgment were intact [Kyphosis] : no kyphosis present [Acanthosis Nigricans] : no acanthosis nigricans [de-identified] : no moon facies, no supraclavicular fat pads [de-identified] : narrow-based gait with cane

## 2023-06-18 NOTE — ASSESSMENT
[FreeTextEntry1] : Osteoporosis. She has a history of vertebral fracture. She has received denosumab from 2022 to present. 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 We discussed the potential benefits and risks of antiresorptive osteoporosis therapy, including but not limited to osteonecrosis of the jaw and atypical femoral fracture. She is tolerating denosumab and we will continue. We discussed that denosumab must be dosed every 6 months due to rebound increase in bone breakdown with abrupt discontinuation of therapy, with transition to bisphosphonate therapy prior to a "drug holiday."\par Continue denosumab 60 mg SC every 6 months; dose administered today\par Calcium 7802-2429 mg daily from diet and supplements (to be taken in divided doses as no more than 500-600 mg can be absorbed at one time); continue current regimen\par Continue current vitamin D regimen\par Diet, exercise and fall prevention discussed\par \par Subclinical hyperthyroidism. She has had persistently low TSH values in the setting of thyroid nodules and negative TSH receptor antibody and thyroid stimulating immunoglobulin levels. Her TSH value was less than 0.1 uIU/mL and she met criteria for therapy. She is tolerating methimazole and we will continue pending results. We have discussed the potential risks and benefits of methimazole, including but not limited to rash, agranulocytosis, and liver failure. The patient has been advised to call me immediately with any concerning symptoms, including rash, fever, sore throat, right upper quadrant pain, jaundice, scleral icterus. \par Continue methimazole 2.5 mg daily pending thyroid function tests\par \par Thyroid nodules. We discussed that approximately 95 percent of all thyroid nodules are caused by benign conditions. We discussed that thyroid nodules are very common and less likely to be cancer in older individuals. We discussed the risks and benefits of evaluation for thyroid cancer and morbidity of treatment if diagnosed versus overall prognosis of thyroid cancer. We will defer further thyroid ultrasound screening.\par \par Next bone density testin months\par Next appointment: 6 months or earlier as needed\par \par GranddaughterLeyla, [phone]\par \par I reviewed the DXA performed on October 3, 2022 with the patient today.\par I reviewed the laboratories performed on 2023 with the patient today. \par I counseled the patient regarding calcium and vitamin D intake today.\par I discussed the following osteoporosis therapies: Denosumab\par \par CC:\par Dr. Vel Lanier, Fax [phone]

## 2023-07-25 ENCOUNTER — LABORATORY RESULT (OUTPATIENT)
Age: 88
End: 2023-07-25

## 2023-07-25 ENCOUNTER — APPOINTMENT (OUTPATIENT)
Dept: NEPHROLOGY | Facility: CLINIC | Age: 88
End: 2023-07-25
Payer: MEDICARE

## 2023-07-25 VITALS — BODY MASS INDEX: 23.32 KG/M2 | WEIGHT: 104 LBS

## 2023-07-25 VITALS — DIASTOLIC BLOOD PRESSURE: 68 MMHG | SYSTOLIC BLOOD PRESSURE: 143 MMHG | HEART RATE: 71 BPM

## 2023-07-25 PROCEDURE — 99214 OFFICE O/P EST MOD 30 MIN: CPT | Mod: 25

## 2023-07-25 PROCEDURE — 36415 COLL VENOUS BLD VENIPUNCTURE: CPT

## 2023-07-25 NOTE — ASSESSMENT
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 \par * Absolute kidney benefits/risks of SGLT2i in this clinical situation are uncertain.\par * Atherosclerotic renal artery stenosis is possible. However, even if present, MITUL may be an incidental finding and benefits of revascularization do not clearly outweigh risks (based on recent studies).\par * Therapies for kidney disease: blood pressure control; proteinuria reduction with ARB/ACEi; other evidence-based therapies including exercise, a plant-based lower oxalate diet, and 400 mcg folic acid daily\par * Cardiovascular disease prevention: counseling on healthy diet, physical activity, weight loss, alcohol limitation, blood pressure control\par * A counseling information sheet on CKD which they have been instructed to read has been given.\par * The patient has been counseled that chronic kidney disease is a significant condition and regular office follow-up with me (at least every 3 months for now) is important for monitoring and their health, and that it is their responsibility to make a follow-up appointment.\par * The patient has been counseled never to stop taking their medications without discussing it with me or another doctor.\par * The patient has been counseled on avoiding NSAIDs.\par * The patient has been counseled on risk of worsening kidney function and instructed to immediately call and speak with me and go immediately to ER with any severe symptoms, nausea, vomiting, diarrhea, chest pain, or shortness of breath.\par \par # HTN controlled, white coat effect.\par * Cont lisinopril and HCTZ.\par * The patient's blood pressure was checked with the [employer] [id number]-[id number] using the SPRINT trial protocol after sitting quietly in an empty room with arm supported, back supported, and feet on the floor for 5 minutes. The average of 3 readings were taken.\par * A counseling information sheet on blood pressure and staying healthy has been given (which they have been instructed to read).\par * The patient has been counseled to check their BP at home with an automatic arm cuff, write down the readings, and reach me directly on the phone immediately if they are persistently > 180 systolic or if SBP is less than 100 or if lightheadedness develops. They were counseled to bring in all blood pressure readings and medications next visit.\par * The patient has been counseled that regular office follow-up (at least every 3 months for now) is important for monitoring and for their health, and that it is their responsibility to make follow up appointments.\par * The patient also has been counseled that they must never stop or change any medications without discussing this with me (or another physician). \par \par # Osteoporosis.\par * Cont HCTZ.\par * Follow up with Dr. Mcbride. \par \par # Fluid overload improved. \par * Low salt diet. \par  \par \par

## 2023-07-25 NOTE — HISTORY OF PRESENT ILLNESS
[FreeTextEntry1] : Kindly referred by Dr. Lanier for CKD\par \par * CKD stable, creatinine 1.4. * eGFR 32 ([id number]). * HTN borderline uncontrolled. BP 120s - 130s at home. No lightheadedness. No CP/SOB.Compliant with medications.  * Ultrasound showed R 8.8 and L atrophic and not well visualized. \par \par Previous history (07Mar23): * CKD stable, creaitnine 1.41 in January 2023. * HTN controlled.  - 140s at home. No lightheadedness. No CP/SOB. Compliant with medications. * Ultrasound showedd R 8.8 and L atrophic and not well visualized. \par \par Previous history (07Dec22): * CKD stable, creatinine 1.41. * HTN controlled. /60 - 80 at home. No lightheadedness. No CP/SOB. Compliant with medications. \par \par Previous history (06Oct22): Labs from [facility] reviewed. Creatinine 1.27 on 9/15 (eGFR 40). Prevously creatinine has been 1.2 - 1.3 since 2021. * Ultrasound showedd R 8.8 and L atrophic and not well visualized. 12 mg albuminuria and no hematuria on U/A.\par \par HTN treated with HCTZ 12.5 and lisinopril 40. * Doesn’t check BP at home. No lightheadedness. No CP/SOB. Compliant with medications. \par \par

## 2023-07-27 ENCOUNTER — NON-APPOINTMENT (OUTPATIENT)
Age: 88
End: 2023-07-27

## 2023-07-27 LAB
ALBUMIN SERPL ELPH-MCNC: 4.2 G/DL
ALP BLD-CCNC: 54 U/L
ALT SERPL-CCNC: 18 U/L
ANION GAP SERPL CALC-SCNC: 17 MMOL/L
APPEARANCE: CLEAR
AST SERPL-CCNC: 27 U/L
BILIRUB SERPL-MCNC: 0.2 MG/DL
BILIRUBIN URINE: NEGATIVE
BLOOD URINE: NEGATIVE
BUN SERPL-MCNC: 27 MG/DL
CALCIUM SERPL-MCNC: 10 MG/DL
CALCIUM SERPL-MCNC: 10 MG/DL
CHLORIDE SERPL-SCNC: 116 MMOL/L
CO2 SERPL-SCNC: 21 MMOL/L
COLOR: YELLOW
CREAT SERPL-MCNC: 1.53 MG/DL
CREAT SPEC-SCNC: 40 MG/DL
CYSTATIN C SERPL-MCNC: 1.77 MG/L
EGFR: 32 ML/MIN/1.73M2
GFR/BSA.PRED SERPLBLD CYS-BASED-ARV: 30 ML/MIN/1.73M2
GLUCOSE QUALITATIVE U: NEGATIVE MG/DL
GLUCOSE SERPL-MCNC: 90 MG/DL
KETONES URINE: NEGATIVE MG/DL
LEUKOCYTE ESTERASE URINE: ABNORMAL
MAGNESIUM SERPL-MCNC: 1.9 MG/DL
MICROALBUMIN 24H UR DL<=1MG/L-MCNC: <1.2 MG/DL
MICROALBUMIN/CREAT 24H UR-RTO: NORMAL MG/G
NITRITE URINE: NEGATIVE
PARATHYROID HORMONE INTACT: 64 PG/ML
PH URINE: 6.5
PHOSPHATE SERPL-MCNC: 3.1 MG/DL
POTASSIUM SERPL-SCNC: 4.3 MMOL/L
PROT SERPL-MCNC: 7.3 G/DL
PROTEIN URINE: NEGATIVE MG/DL
SODIUM SERPL-SCNC: 153 MMOL/L
SPECIFIC GRAVITY URINE: 1.01
UROBILINOGEN URINE: 0.2 MG/DL

## 2023-10-30 ENCOUNTER — LABORATORY RESULT (OUTPATIENT)
Age: 88
End: 2023-10-30

## 2023-10-30 ENCOUNTER — APPOINTMENT (OUTPATIENT)
Dept: NEPHROLOGY | Facility: CLINIC | Age: 88
End: 2023-10-30
Payer: MEDICARE

## 2023-10-30 VITALS — SYSTOLIC BLOOD PRESSURE: 135 MMHG | DIASTOLIC BLOOD PRESSURE: 59 MMHG

## 2023-10-30 VITALS — HEART RATE: 79 BPM | DIASTOLIC BLOOD PRESSURE: 54 MMHG | SYSTOLIC BLOOD PRESSURE: 104 MMHG

## 2023-10-30 PROCEDURE — 99214 OFFICE O/P EST MOD 30 MIN: CPT | Mod: 25

## 2023-10-30 PROCEDURE — 36415 COLL VENOUS BLD VENIPUNCTURE: CPT

## 2023-11-04 LAB
ALBUMIN SERPL ELPH-MCNC: 4.2 G/DL
ALP BLD-CCNC: 53 U/L
ALT SERPL-CCNC: 16 U/L
ANION GAP SERPL CALC-SCNC: 11 MMOL/L
APPEARANCE: CLEAR
AST SERPL-CCNC: 23 U/L
BASOPHILS # BLD AUTO: 0.07 K/UL
BASOPHILS NFR BLD AUTO: 0.9 %
BILIRUB SERPL-MCNC: <0.2 MG/DL
BILIRUBIN URINE: NEGATIVE
BLOOD URINE: NEGATIVE
BUN SERPL-MCNC: 29 MG/DL
CALCIUM SERPL-MCNC: 10 MG/DL
CHLORIDE SERPL-SCNC: 108 MMOL/L
CO2 SERPL-SCNC: 23 MMOL/L
COLOR: YELLOW
CREAT SERPL-MCNC: 1.85 MG/DL
CREAT SPEC-SCNC: 214 MG/DL
CYSTATIN C SERPL-MCNC: 2.36 MG/L
EGFR: 26 ML/MIN/1.73M2
EOSINOPHIL # BLD AUTO: 0.17 K/UL
EOSINOPHIL NFR BLD AUTO: 2.3 %
GFR/BSA.PRED SERPLBLD CYS-BASED-ARV: 21 ML/MIN/1.73M2
GLUCOSE QUALITATIVE U: NEGATIVE MG/DL
GLUCOSE SERPL-MCNC: 84 MG/DL
HCT VFR BLD CALC: 34.9 %
HGB BLD-MCNC: 11.3 G/DL
IMM GRANULOCYTES NFR BLD AUTO: 0.1 %
KETONES URINE: ABNORMAL MG/DL
LEUKOCYTE ESTERASE URINE: ABNORMAL
LYMPHOCYTES # BLD AUTO: 2.29 K/UL
LYMPHOCYTES NFR BLD AUTO: 30.5 %
MAGNESIUM SERPL-MCNC: 2 MG/DL
MAN DIFF?: NORMAL
MCHC RBC-ENTMCNC: 30.1 PG
MCHC RBC-ENTMCNC: 32.4 GM/DL
MCV RBC AUTO: 92.8 FL
MICROALBUMIN 24H UR DL<=1MG/L-MCNC: 1.2 MG/DL
MICROALBUMIN/CREAT 24H UR-RTO: 6 MG/G
MONOCYTES # BLD AUTO: 0.71 K/UL
MONOCYTES NFR BLD AUTO: 9.5 %
NEUTROPHILS # BLD AUTO: 4.25 K/UL
NEUTROPHILS NFR BLD AUTO: 56.7 %
NITRITE URINE: NEGATIVE
PH URINE: 5.5
PHOSPHATE SERPL-MCNC: 3.1 MG/DL
PLATELET # BLD AUTO: 186 K/UL
POTASSIUM SERPL-SCNC: 4.3 MMOL/L
PROT SERPL-MCNC: 7.3 G/DL
PROTEIN URINE: NEGATIVE MG/DL
RBC # BLD: 3.76 M/UL
RBC # FLD: 13.7 %
SODIUM SERPL-SCNC: 142 MMOL/L
SPECIFIC GRAVITY URINE: 1.02
TSH SERPL-ACNC: 0.28 UIU/ML
UROBILINOGEN URINE: 1 MG/DL
WBC # FLD AUTO: 7.5 K/UL

## 2023-12-10 ENCOUNTER — NON-APPOINTMENT (OUTPATIENT)
Age: 88
End: 2023-12-10

## 2023-12-11 RX ORDER — DENOSUMAB 60 MG/ML
60 INJECTION SUBCUTANEOUS
Qty: 1 | Refills: 1 | Status: ACTIVE | COMMUNITY
Start: 2023-12-08 | End: 1900-01-01

## 2023-12-19 ENCOUNTER — APPOINTMENT (OUTPATIENT)
Dept: ENDOCRINOLOGY | Facility: CLINIC | Age: 88
End: 2023-12-19
Payer: MEDICARE

## 2023-12-19 VITALS
HEIGHT: 56 IN | HEART RATE: 75 BPM | WEIGHT: 101 LBS | BODY MASS INDEX: 22.72 KG/M2 | SYSTOLIC BLOOD PRESSURE: 140 MMHG | DIASTOLIC BLOOD PRESSURE: 64 MMHG

## 2023-12-19 PROCEDURE — 36415 COLL VENOUS BLD VENIPUNCTURE: CPT

## 2023-12-19 PROCEDURE — 99214 OFFICE O/P EST MOD 30 MIN: CPT | Mod: 25

## 2023-12-19 PROCEDURE — 96401 CHEMO ANTI-NEOPL SQ/IM: CPT

## 2023-12-19 RX ORDER — METHIMAZOLE 5 MG/1
5 TABLET ORAL
Qty: 45 | Refills: 2 | Status: ACTIVE | COMMUNITY
Start: 2022-09-12 | End: 1900-01-01

## 2023-12-19 RX ORDER — DENOSUMAB 60 MG/ML
60 INJECTION SUBCUTANEOUS
Qty: 1 | Refills: 0 | Status: COMPLETED | OUTPATIENT
Start: 2023-12-19

## 2023-12-19 RX ADMIN — DENOSUMAB 60 MG/ML: 60 INJECTION SUBCUTANEOUS at 00:00

## 2023-12-20 LAB
25(OH)D3 SERPL-MCNC: 69.8 NG/ML
ALBUMIN SERPL ELPH-MCNC: 4.4 G/DL
ALP BLD-CCNC: 58 U/L
ALT SERPL-CCNC: 16 U/L
ANION GAP SERPL CALC-SCNC: 15 MMOL/L
AST SERPL-CCNC: 23 U/L
BILIRUB SERPL-MCNC: 0.2 MG/DL
BUN SERPL-MCNC: 26 MG/DL
CALCIUM SERPL-MCNC: 10.2 MG/DL
CHLORIDE SERPL-SCNC: 106 MMOL/L
CO2 SERPL-SCNC: 23 MMOL/L
CREAT SERPL-MCNC: 1.76 MG/DL
CYSTATIN C SERPL-MCNC: 1.99 MG/L
EGFR: 27 ML/MIN/1.73M2
GFR/BSA.PRED SERPLBLD CYS-BASED-ARV: 26 ML/MIN/1.73M2
GLUCOSE SERPL-MCNC: 84 MG/DL
POTASSIUM SERPL-SCNC: 4.2 MMOL/L
PROT SERPL-MCNC: 7.4 G/DL
SODIUM SERPL-SCNC: 144 MMOL/L
T3 SERPL-MCNC: 125 NG/DL
T4 FREE SERPL-MCNC: 1 NG/DL
T4 SERPL-MCNC: 7.4 UG/DL
TSH SERPL-ACNC: 1.5 UIU/ML

## 2023-12-20 NOTE — ASSESSMENT
[FreeTextEntry1] : Osteoporosis. She has a history of vertebral fracture. She has received denosumab from 2022 to present. 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 We discussed the potential benefits and risks of antiresorptive osteoporosis therapy, including but not limited to osteonecrosis of the jaw and atypical femoral fracture. She is tolerating denosumab and we will continue. We discussed that denosumab must be dosed every 6 months due to rebound increase in bone breakdown with abrupt discontinuation of therapy, with transition to bisphosphonate therapy prior to a "drug holiday." Continue denosumab 60 mg SC every 6 months; dose administered today Calcium 7533-6912 mg daily from diet and supplements (to be taken in divided doses as no more than 500-600 mg can be absorbed at one time); continue current regimen Continue current vitamin D regimen pending level Diet, exercise and fall prevention discussed  Subclinical hyperthyroidism. She has had persistently low TSH values in the setting of thyroid nodules and negative TSH receptor antibody and thyroid stimulating immunoglobulin levels. Her TSH value was less than 0.1 uIU/mL and she met criteria for therapy. She is tolerating methimazole and we will continue pending results. We have discussed the potential risks and benefits of methimazole, including but not limited to rash, agranulocytosis, and liver failure. The patient has been advised to call me immediately with any concerning symptoms, including rash, fever, sore throat, right upper quadrant pain, jaundice, scleral icterus. Continue methimazole 2.5 mg daily pending thyroid function tests  Thyroid nodules. We discussed that approximately 95 percent of all thyroid nodules are caused by benign conditions. We discussed that thyroid nodules are very common and less likely to be cancer in older individuals. We discussed the risks and benefits of evaluation for thyroid cancer and morbidity of treatment if diagnosed versus overall prognosis of thyroid cancer. We will defer further thyroid ultrasound screening.  Next bone density testin months Next appointment: 6 months or earlier as needed  GranddaLeyla castellon, [phone]  I reviewed the DXA performed on October 3, 2022 with the patient today. I reviewed the laboratories performed from 2023 to present with the patient today. I counseled the patient regarding calcium and vitamin D intake today. I discussed the following osteoporosis therapies: Denosumab  CC: Dr. Vel Lanier, Fax [phone]

## 2023-12-20 NOTE — PHYSICAL EXAM
[Alert] : alert [Healthy Appearance] : healthy appearance [No Acute Distress] : no acute distress [Normal Sclera/Conjunctiva] : normal sclera/conjunctiva [Normal Hearing] : hearing was normal [No Respiratory Distress] : no respiratory distress [No Stigmata of Cushings Syndrome] : no stigmata of Cushings Syndrome [Normal Insight/Judgement] : insight and judgment were intact [Kyphosis] : no kyphosis present [Acanthosis Nigricans] : no acanthosis nigricans [de-identified] : no moon facies, no supraclavicular fat pads [de-identified] : narrow-based gait with cane

## 2023-12-20 NOTE — HISTORY OF PRESENT ILLNESS
[FreeTextEntry1] : Ms. Rendon is a 89 year-old woman with a history of multiple medical problems including cerebrovascular accident presenting for follow-up of subclinical hypothyroidism, thyroid nodules, and recently diagnosed osteoporosis. I saw her for an initial visit in September 2022 and last in June 2023. She is accompanied by her daughter.  Subclinical hypothyroidism. Thyroid nodules. She was noted to have low thyroid stimulating hormone levels of 0.01 uIU/mL (normal: 0.40-4.50) in February and March 2022. No known previous history of thyroid hormone abnormalities. No history of thyroid medication use. Laboratory evaluation from her initial visit demonstrated TSH 0.02 uIU/mL with T4/T3 within range. TSH receptor antibody and thyroid stimulating immunoglobulin negative. Thyroid peroxidase antibody positive. We started methimazole 2.5 mg daily.  Thyroid ultrasound in January 2021 demonstrated a left midpole 2.6 x 1.4 x 1.6 cm nodule with calcification and a right upper pole subcentimeter nodule. Biopsy of the left midpole nodule in February 2021 with benign (Burwell II) cytopathology.  Thyroid ultrasound in March 2022 overall stable from previous. No history of radiation exposure. Daughter with history of goiter status post thyroidectomy with benign pathology.  Bone History Osteoporosis diagnosed in 2022 on routine bone density significant for T-scores of -1.1 at the lumbar spine, -2.6 at the femoral neck, -1.3 at the total hip, and -2.4 at the distal radius Fracture history: Morphometric L1 vertebral fracture noted on vertebral fracture assessment in 2022 Family history: No parental history of hip fracture Treatment: Denosumab 60 mg SC in December 2022, June 2023  Falls: None recent Height loss: 3 inches Kidney stones: No Dental health: Irregular appointments, history of implants, no upcoming procedures planned Exercise: Dances, walks Dairy intake: 1/2-1 serving daily (milk in coffee, cheese a few days a week, occasional glass of milk) Calcium supplements: 600 mg daily Multivitamin: None Vitamin D supplements: Ergocalciferol 50,000 intl units every two weeks  Osteoporosis risk factors include: Postmenopausal status,  race, prior fracture, falls, height loss, small thin bones, tobacco use, excessive alcohol, anorexia, family history, vitamin D deficiency, corticosteroid use, seizure medications, malabsorption, hyperparathyroidism, hyperthyroidism. NEGATIVE EXCEPT: Postmenopausal status, prior fracture  Interim History  She has received denosumab from December 2022 to present.  Laboratory results from last visit as below. Thyroid function tests within the normal range and recommended continuing her current regimen of methimazole 2.5 mg daily. eGFR around baseline.  She has seen Dr. Bradford Wynne; note reviewed. Last laboratory results reviewed. TSH 0.28 uIU/mL. eGFR trending down.  She has recovered from an upper respiratory infection about a month ago. She feels well today. No dysphagia for liquids or solids. No palpitations. Review of systems otherwise negative.  Medical and surgical history, medications, allergies, social and family history reviewed and updated as needed.

## 2023-12-20 NOTE — DATA REVIEWED
[FreeTextEntry1] : Most recent bone mineral density Date: August 26, 2022 Source: Hologic Site: [facility] Radiology  Site	BMD	T-score	Change previous	Change baseline Lumbar spine	0.926	-1.1		 Femoral neck	0.563	-2.6		 Total hip                  	0.785	-1.3		 Distal radius	0.553	-2.4		 DXA comments:  Vertebral fracture assessment: T5 to L5 visualized. Vertebral compression fracture noted at L1 (my read).  Impression: I have personally reviewed the DXA images and report. There is osteoporosis at the femoral neck. Vertebral fracture analysis demonstrates a vertebral fracture at L1.   Thyroid ultrasound (March 22, 2022) reviewed and significant for: ISTHMUS: Normal size in AP dimension measuring 0.2 cm. RIGHT LOBE:  Measures 3.1 x 0.9 x 1.0 cm in sagittal x AP x transverse dimensions.  Volume 1.5 cc. ARCHITECTURE: Heterogeneous..  COLOR DOPPLER:  Unremarkable. RIGHT LOBE NODULES:  0.3 x 0.2 x 0.3 cm hypoechoic upper pole nodule previously measured 0.3 x 0.2 x 0.3 cm. LEFT LOBE:    Measures 3.5 x 1.6 x 1.9 cm in sagittal x AP x transverse dimensions. Volume 5.3 cc. ARCHITECTURE: Heterogeneous..  COLOR DOPPLER:  Unremarkable. LEFT LOBE NODULES: The previously biopsied 2.4 x 1.5 x 1.6 cm heterogeneous mid pole solid nodule with microcalcification measured 2.6 x 1.4 x 1.6 cm on the prior study. NECK: 2.1 x 0.5 x 1.0 cm abnormal-appearing heterogeneous level 3 lymph node right neck with echogenic foci previously measured 2.4 x 0.5 x 1.1 cm. 1.5 x 0.9 x 1.5 cm level 2 lymph node left neck. 1.8 x 0.8 x 1.5 cm abnormal appearing level 3 lymph node left neck with echogenic foci previously measured 2.0 x 0.8 x 1.4 cm. IMPRESSION:   Thyroid nodules without significant interval change. Multiple abnormal appearing cervical lymph nodes without significant interval change.

## 2024-01-03 ENCOUNTER — APPOINTMENT (OUTPATIENT)
Dept: NEPHROLOGY | Facility: CLINIC | Age: 89
End: 2024-01-03
Payer: MEDICARE

## 2024-01-03 VITALS — DIASTOLIC BLOOD PRESSURE: 56 MMHG | SYSTOLIC BLOOD PRESSURE: 129 MMHG | HEART RATE: 69 BPM

## 2024-01-03 VITALS — WEIGHT: 99 LBS | BODY MASS INDEX: 22.2 KG/M2

## 2024-01-03 PROCEDURE — 99214 OFFICE O/P EST MOD 30 MIN: CPT

## 2024-01-03 RX ORDER — HYDROCHLOROTHIAZIDE 12.5 MG/1
12.5 TABLET ORAL
Qty: 30 | Refills: 3 | Status: ACTIVE | COMMUNITY
Start: 1900-01-01 | End: 1900-01-01

## 2024-01-03 NOTE — HISTORY OF PRESENT ILLNESS
[FreeTextEntry1] : Kindly referred by Dr. Lanier for CKD  * CKD stable, creatinine 1.76. Atrophic kidneys. No albuminuria.  * BP controlled.  - 120s at home. No lightheadedness. No CP/SOB. Compliant with medications. *   Previous history (30Oct23): * CKD previously stable, 2.09 in 8/18/23 (Previously 1.6 in 2/17/23). * On HCTZ 12.5 mg and lisinopril 40. * BP controlled.  - 120s at home. No lightheadedness. No CP/SOB. Compliant with medications. *   Previous history (25Jul23):  CKD stable, creatinine 1.4. * eGFR 32 ([id number]). * HTN borderline uncontrolled. BP 120s - 130s at home. No lightheadedness. No CP/SOB.Compliant with medications.  * Ultrasound showedd R 8.8 and L atrophic and not well visualized.   Previous history (07Mar23): * CKD stable, creaitnine 1.41 in January 2023. * HTN controlled.  - 140s at home. No lightheadedness. No CP/SOB. Compliant with medications. * Ultrasound showedd R 8.8 and L atrophic and not well visualized.   Previous history (07Dec22): * CKD stable, creatinine 1.41. * HTN controlled. /60 - 80 at home. No lightheadedness. No CP/SOB. Compliant with medications.   Previous history (06Oct22): Labs from [facility] reviewed. Creatinine 1.27 on 9/15 (eGFR 40). Prevously creatinine has been 1.2 - 1.3 since 2021. * Ultrasound showedd R 8.8 and L atrophic and not well visualized. 12 mg albuminuria and no hematuria on U/A.  HTN treated with HCTZ 12.5 and lisinopril 40. * Doesn't check BP at home. No lightheadedness. No CP/SOB. Compliant with medications.

## 2024-01-03 NOTE — PHYSICAL EXAM
[General Appearance - Alert] : alert [General Appearance - In No Acute Distress] : in no acute distress [Neck Appearance] : the appearance of the neck was normal [Neck Cervical Mass (___cm)] : no neck mass was observed [Jugular Venous Distention Increased] : there was no jugular-venous distention [Thyroid Diffuse Enlargement] : the thyroid was not enlarged [Thyroid Nodule] : there were no palpable thyroid nodules [Auscultation Breath Sounds / Voice Sounds] : lungs were clear to auscultation bilaterally [Heart Rate And Rhythm] : heart rate was normal and rhythm regular [Heart Sounds] : normal S1 and S2 [Heart Sounds Gallop] : no gallops [Murmurs] : no murmurs [Heart Sounds Pericardial Friction Rub] : no pericardial rub [Bowel Sounds] : normal bowel sounds [Abdomen Soft] : soft [Abdomen Tenderness] : non-tender [] : no hepato-splenomegaly [Abdomen Mass (___ Cm)] : no abdominal mass palpated [Cervical Lymph Nodes Enlarged Posterior Bilaterally] : posterior cervical [Cervical Lymph Nodes Enlarged Anterior Bilaterally] : anterior cervical [Supraclavicular Lymph Nodes Enlarged Bilaterally] : supraclavicular [FreeTextEntry1] : trace ankle edema

## 2024-01-03 NOTE — ASSESSMENT
[FreeTextEntry1] : -- # CKD stage 3 c/w aging and nephrosclerosis, atrophic kidneys.  * Recheck labs next visit.   * Absolute kidney benefits/risks of SGLT2i in this clinical situation are uncertain.  * Atherosclerotic renal artery stenosis is possible. However, even if present, MITUL may be an incidental finding and benefits of revascularization do not clearly outweigh risks (based on recent studies).  * Therapies for kidney disease: blood pressure control; proteinuria reduction with ARB/ACEi; other evidence-based therapies including exercise, a plant-based lower oxalate diet, and 400 mcg folic acid daily  * Cardiovascular disease prevention: counseling on healthy diet, physical activity, weight loss, alcohol limitation, blood pressure control  * A counseling information sheet on CKD which they have been instructed to read has been given.  * The patient has been counseled that chronic kidney disease is a significant condition and regular office follow-up with me (at least every 3 months for now) is important for monitoring and their health, and that it is their responsibility to make a follow-up appointment.  * The patient has been counseled never to stop taking their medications without discussing it with me or another doctor.  * The patient has been counseled on avoiding NSAIDs.  * The patient has been counseled on risk of worsening kidney function and instructed to immediately call and speak with me and go immediately to ER with any severe symptoms, nausea, vomiting, diarrhea, chest pain, or shortness of breath.  # HTN controlled. * Cont HCTZ  MWF, lisinopril.  * The patient's blood pressure was checked with the [employer] [id number]-[id number] using the SPRINT trial protocol after sitting quietly in an empty room with arm supported, back supported, and feet on the floor for 5 minutes. The average of 3 readings were taken. * A counseling information sheet on blood pressure and staying healthy has been given (which they have been instructed to read). * The patient has been counseled to check their BP at home with an automatic arm cuff, write down the readings, and reach me directly on the phone immediately if they are persistently > 180 systolic or if SBP is less than 100 or if lightheadedness develops. They were counseled to bring in all blood pressure readings and medications next visit. * The patient has been counseled that regular office follow-up (at least every 3 months for now) is important for monitoring and for their health, and that it is their responsibility to make follow up appointments. * The patient also has been counseled that they must never stop or change any medications without discussing this with me (or another physician).  # Osteoporosis.  * Follow up with Dr. Mcbride.   # Fluid overload improved.  * Low salt diet.

## 2024-04-03 ENCOUNTER — APPOINTMENT (OUTPATIENT)
Dept: NEPHROLOGY | Facility: CLINIC | Age: 89
End: 2024-04-03

## 2024-04-08 ENCOUNTER — APPOINTMENT (OUTPATIENT)
Dept: NEPHROLOGY | Facility: CLINIC | Age: 89
End: 2024-04-08

## 2024-05-06 ENCOUNTER — APPOINTMENT (OUTPATIENT)
Dept: NEPHROLOGY | Facility: CLINIC | Age: 89
End: 2024-05-06
Payer: MEDICARE

## 2024-05-06 VITALS — WEIGHT: 99 LBS | BODY MASS INDEX: 22.2 KG/M2

## 2024-05-06 VITALS — HEART RATE: 68 BPM | SYSTOLIC BLOOD PRESSURE: 141 MMHG | DIASTOLIC BLOOD PRESSURE: 62 MMHG

## 2024-05-06 DIAGNOSIS — I10 ESSENTIAL (PRIMARY) HYPERTENSION: ICD-10-CM

## 2024-05-06 DIAGNOSIS — N18.30 CHRONIC KIDNEY DISEASE, STAGE 3 UNSPECIFIED: ICD-10-CM

## 2024-05-06 DIAGNOSIS — E87.70 FLUID OVERLOAD, UNSPECIFIED: ICD-10-CM

## 2024-05-06 PROCEDURE — 99214 OFFICE O/P EST MOD 30 MIN: CPT

## 2024-05-06 PROCEDURE — G2211 COMPLEX E/M VISIT ADD ON: CPT

## 2024-05-06 NOTE — PHYSICAL EXAM
[General Appearance - Alert] : alert [General Appearance - In No Acute Distress] : in no acute distress [Neck Appearance] : the appearance of the neck was normal [Neck Cervical Mass (___cm)] : no neck mass was observed [Jugular Venous Distention Increased] : there was no jugular-venous distention [Thyroid Diffuse Enlargement] : the thyroid was not enlarged [Thyroid Nodule] : there were no palpable thyroid nodules [Auscultation Breath Sounds / Voice Sounds] : lungs were clear to auscultation bilaterally [Heart Rate And Rhythm] : heart rate was normal and rhythm regular [Heart Sounds] : normal S1 and S2 [Heart Sounds Gallop] : no gallops [Murmurs] : no murmurs [Heart Sounds Pericardial Friction Rub] : no pericardial rub [FreeTextEntry1] : trace ankle edema [Bowel Sounds] : normal bowel sounds [Abdomen Soft] : soft [Abdomen Tenderness] : non-tender [] : no hepato-splenomegaly [Abdomen Mass (___ Cm)] : no abdominal mass palpated [Cervical Lymph Nodes Enlarged Posterior Bilaterally] : posterior cervical [Cervical Lymph Nodes Enlarged Anterior Bilaterally] : anterior cervical [Supraclavicular Lymph Nodes Enlarged Bilaterally] : supraclavicular

## 2024-05-06 NOTE — HISTORY OF PRESENT ILLNESS
[FreeTextEntry1] : Kindly referred by Dr. Lanier for CKD  * eGFR 33, creatinine 1.5 * BP controlled.  - 120s at home. White coat effect. No lightheadedness. No CP/SOB. Compliant with medications. * L kidney atrophic.  Previous history (08Apr24): * CKD stable, creatinine 1.76. Atrophic kidneys. No albuminuria.  * BP controlled.  - 120s at home. No lightheadedness. No CP/SOB. Compliant with medications. *   Previous history (30Oct23): * CKD previously stable, 2.09 in 8/18/23 (Previously 1.6 in 2/17/23). * On HCTZ 12.5 mg and lisinopril 40. * BP controlled.  - 120s at home. No lightheadedness. No CP/SOB. Compliant with medications. *   Previous history (25Jul23):  CKD stable, creatinine 1.4. * eGFR 32 ([id number]). * HTN borderline uncontrolled. BP 120s - 130s at home. No lightheadedness. No CP/SOB.Compliant with medications.  * Ultrasound showedd R 8.8 and L atrophic and not well visualized.   Previous history (07Mar23): * CKD stable, creaitnine 1.41 in January 2023. * HTN controlled.  - 140s at home. No lightheadedness. No CP/SOB. Compliant with medications. * Ultrasound showedd R 8.8 and L atrophic and not well visualized.   Previous history (07Dec22): * CKD stable, creatinine 1.41. * HTN controlled. /60 - 80 at home. No lightheadedness. No CP/SOB. Compliant with medications.   Previous history (06Oct22): Labs from [facility] reviewed. Creatinine 1.27 on 9/15 (eGFR 40). Prevously creatinine has been 1.2 - 1.3 since 2021. * Ultrasound showedd R 8.8 and L atrophic and not well visualized. 12 mg albuminuria and no hematuria on U/A.  HTN treated with HCTZ 12.5 and lisinopril 40. * Doesn't check BP at home. No lightheadedness. No CP/SOB. Compliant with medications.

## 2024-05-06 NOTE — ASSESSMENT
[FreeTextEntry1] : -- # CKD stage 3 c/w aging and nephrosclerosis, atrophic kidneys.  * Recheck labs next visit.   * Absolute kidney benefits/risks of SGLT2i in this clinical situation are uncertain.  * Therapies for kidney disease: blood pressure control; proteinuria reduction with ARB/ACEi; other evidence-based therapies including exercise, a plant-based lower oxalate diet, and 400 mcg folic acid daily  * Cardiovascular disease prevention: counseling on healthy diet, physical activity, weight loss, alcohol limitation, blood pressure control  * A counseling information sheet on CKD which they have been instructed to read has been given.  * The patient has been counseled that chronic kidney disease is a significant condition and regular office follow-up with me (at least every 3 months for now) is important for monitoring and their health, and that it is their responsibility to make a follow-up appointment.  * The patient has been counseled never to stop taking their medications without discussing it with me or another doctor.  * The patient has been counseled on avoiding NSAIDs.  * The patient has been counseled on risk of worsening kidney function and instructed to immediately call and speak with me and go immediately to ER with any severe symptoms, nausea, vomiting, diarrhea, chest pain, or shortness of breath.  # HTN controlled. * Cont HCTZ MWF, lisinopril. * The patient's blood pressure was checked with the [employer] [id number]-907XL using the SPRINT trial protocol after sitting quietly in an empty room with arm supported, back supported, and feet on the floor for 5 minutes. The average of 3 readings were taken. * A counseling information sheet on blood pressure and staying healthy has been given (which they have been instructed to read). * The patient has been counseled to check their BP at home with an automatic arm cuff, write down the readings, and reach me directly on the phone immediately if they are persistently > 180 systolic or if SBP is less than 100 or if lightheadedness develops. They were counseled to bring in all blood pressure readings and medications next visit. * The patient has been counseled that regular office follow-up (at least every 3 months for now) is important for monitoring and for their health, and that it is their responsibility to make follow up appointments. * The patient also has been counseled that they must never stop or change any medications without discussing this with me (or another physician).  # Osteoporosis.  * Follow up with Dr. Mcbride.   # Fluid overload improved.  * Low salt diet.

## 2024-06-11 ENCOUNTER — APPOINTMENT (OUTPATIENT)
Dept: ENDOCRINOLOGY | Facility: CLINIC | Age: 89
End: 2024-06-11

## 2024-06-12 ENCOUNTER — APPOINTMENT (OUTPATIENT)
Dept: ENDOCRINOLOGY | Facility: CLINIC | Age: 89
End: 2024-06-12
Payer: MEDICARE

## 2024-06-12 VITALS
HEART RATE: 78 BPM | WEIGHT: 100 LBS | SYSTOLIC BLOOD PRESSURE: 180 MMHG | DIASTOLIC BLOOD PRESSURE: 76 MMHG | BODY MASS INDEX: 22.42 KG/M2

## 2024-06-12 DIAGNOSIS — E55.9 VITAMIN D DEFICIENCY, UNSPECIFIED: ICD-10-CM

## 2024-06-12 DIAGNOSIS — E05.90 THYROTOXICOSIS, UNSPECIFIED W/OUT THYROTOXIC CRISIS OR STORM: ICD-10-CM

## 2024-06-12 DIAGNOSIS — S32.009A UNSPECIFIED FRACTURE OF UNSPECIFIED LUMBAR VERTEBRA, INITIAL ENCOUNTER FOR CLOSED FRACTURE: ICD-10-CM

## 2024-06-12 DIAGNOSIS — E04.1 NONTOXIC SINGLE THYROID NODULE: ICD-10-CM

## 2024-06-12 DIAGNOSIS — M81.0 AGE-RELATED OSTEOPOROSIS W/OUT CURRENT PATHOLOGICAL FRACTURE: ICD-10-CM

## 2024-06-12 PROCEDURE — 96401 CHEMO ANTI-NEOPL SQ/IM: CPT

## 2024-06-12 PROCEDURE — 99214 OFFICE O/P EST MOD 30 MIN: CPT | Mod: 25

## 2024-06-12 RX ORDER — DENOSUMAB 60 MG/ML
60 INJECTION SUBCUTANEOUS
Qty: 1 | Refills: 0 | Status: COMPLETED | OUTPATIENT
Start: 2024-06-12

## 2024-06-12 RX ORDER — ERGOCALCIFEROL 1.25 MG/1
1.25 MG CAPSULE ORAL
Qty: 12 | Refills: 3 | Status: ACTIVE | COMMUNITY
Start: 2022-11-04 | End: 1900-01-01

## 2024-06-12 RX ADMIN — DENOSUMAB 60 MG/ML: 60 INJECTION SUBCUTANEOUS at 00:00

## 2024-06-12 NOTE — HISTORY OF PRESENT ILLNESS
[FreeTextEntry1] : Ms. Rendon is a 89 year-old woman with a history of multiple medical problems including cerebrovascular accident presenting for follow-up of subclinical hypothyroidism, thyroid nodules, and recently diagnosed osteoporosis. I saw her for an initial visit in September 2022 and last in December 2023.   Subclinical hypothyroidism. Thyroid nodules. She was noted to have low thyroid stimulating hormone levels of 0.01 uIU/mL (normal: 0.40-4.50) in February and March 2022. No known previous history of thyroid hormone abnormalities. No history of thyroid medication use. Laboratory evaluation from her initial visit demonstrated TSH 0.02 uIU/mL with T4/T3 within range. TSH receptor antibody and thyroid stimulating immunoglobulin negative. Thyroid peroxidase antibody positive. We started methimazole 2.5 mg daily.  Thyroid ultrasound in January 2021 demonstrated a left midpole 2.6 x 1.4 x 1.6 cm nodule with calcification and a right upper pole subcentimeter nodule. Biopsy of the left midpole nodule in February 2021 with benign ([place] II) cytopathology.  Thyroid ultrasound in March 2022 overall stable from previous. No history of radiation exposure. Daughter with history of goiter status post thyroidectomy with benign pathology.  Bone History Osteoporosis diagnosed in 2022 on routine bone density significant for T-scores of -1.1 at the lumbar spine, -2.6 at the femoral neck, -1.3 at the total hip, and -2.4 at the distal radius Fracture history: Morphometric L1 vertebral fracture noted on vertebral fracture assessment in 2022 Family history: No parental history of hip fracture Treatment: Denosumab 60 mg SC in December 2022, June 2023, December 2023  Falls: None recent Height loss: 3 inches Kidney stones: No Dental health: Irregular appointments, history of implants, no upcoming procedures planned Exercise: Dances, walks Dairy intake: 1/2-1 serving daily (milk in coffee, cheese a few days a week, occasional glass of milk) Calcium supplements: 600 mg daily Multivitamin: None Vitamin D supplements: Ergocalciferol 50,000 intl units every two weeks  Osteoporosis risk factors include: Postmenopausal status,  race, prior fracture, falls, height loss, small thin bones, tobacco use, excessive alcohol, anorexia, family history, vitamin D deficiency, corticosteroid use, seizure medications, malabsorption, hyperparathyroidism, hyperthyroidism. NEGATIVE EXCEPT: Postmenopausal status, prior fracture  Interim History  She has received denosumab from December 2022 to present.  Laboratory results from last visit as below. Thyroid function tests within the normal range and recommended continuing her current regimen of methimazole 2.5 mg daily. eGFR around baseline.  She has seen Dr. Bradford Wynne; notes reviewed.  She feels well today. No dysphagia for liquids or solids. No palpitations. Review of systems otherwise negative.  Medical and surgical history, medications, allergies, social and family history reviewed and updated as needed.

## 2024-06-12 NOTE — ADDENDUM
[FreeTextEntry1] : Procedure Note: Denosumab 60 mg SC administered into left upper arm. Lucía Mcbride MD

## 2024-06-12 NOTE — DATA REVIEWED
[FreeTextEntry1] : Laboratories (March 27, 2024) reviewed and significant for:  Hemoglobin 10.0 g/dL (normal: 11.7-15.5) Calcium 9.4 mg/dL (albumin 3.7 g/dL)  pg/mL (normal: 16-77) BUN/creatinine 20/1350 mg/dL (eGFR 33 mL/min) Alkaline phosphatase 41 U/L Phosphorus 2.6 mg/dL Magnesium 2.1 mg/dL  Most recent bone mineral density Date: August 26, 2022 Source: [employer] [facility] Site: [facility]  Site	BMD	T-score	Change previous	Change baseline Lumbar spine	0.926	-1.1		 Femoral neck	0.563	-2.6		 Total hip           0.785	-1.3		 Distal radius	0.553	-2.4		 DXA comments:  Vertebral fracture assessment: T5 to L5 visualized. Vertebral compression fracture noted at L1 (my read).  Impression: I have personally reviewed the DXA images and report. There is osteoporosis at the femoral neck. Vertebral fracture analysis demonstrates a vertebral fracture at L1.   Thyroid ultrasound (March 22, 2022) reviewed and significant for: ISTHMUS: Normal size in AP dimension measuring 0.2 cm. RIGHT LOBE:  Measures 3.1 x 0.9 x 1.0 cm in sagittal x AP x transverse dimensions.  Volume 1.5 cc. ARCHITECTURE: Heterogeneous..  COLOR DOPPLER:  Unremarkable. RIGHT LOBE NODULES:  0.3 x 0.2 x 0.3 cm hypoechoic upper pole nodule previously measured 0.3 x 0.2 x 0.3 cm. LEFT LOBE:    Measures 3.5 x 1.6 x 1.9 cm in sagittal x AP x transverse dimensions. Volume 5.3 cc. ARCHITECTURE: Heterogeneous..  COLOR DOPPLER:  Unremarkable. LEFT LOBE NODULES: The previously biopsied 2.4 x 1.5 x 1.6 cm heterogeneous mid pole solid nodule with microcalcification measured 2.6 x 1.4 x 1.6 cm on the prior study. NECK: 2.1 x 0.5 x 1.0 cm abnormal-appearing heterogeneous level 3 lymph node right neck with echogenic foci previously measured 2.4 x 0.5 x 1.1 cm. 1.5 x 0.9 x 1.5 cm level 2 lymph node left neck. 1.8 x 0.8 x 1.5 cm abnormal appearing level 3 lymph node left neck with echogenic foci previously measured 2.0 x 0.8 x 1.4 cm. IMPRESSION:   Thyroid nodules without significant interval change. Multiple abnormal appearing cervical lymph nodes without significant interval change.

## 2024-06-12 NOTE — PHYSICAL EXAM
[Alert] : alert [Healthy Appearance] : healthy appearance [No Acute Distress] : no acute distress [Normal Sclera/Conjunctiva] : normal sclera/conjunctiva [Normal Hearing] : hearing was normal [No Respiratory Distress] : no respiratory distress [Kyphosis] : no kyphosis present [No Stigmata of Cushings Syndrome] : no stigmata of Cushings Syndrome [Acanthosis Nigricans] : no acanthosis nigricans [Normal Insight/Judgement] : insight and judgment were intact [de-identified] : no moon facies, no supraclavicular fat pads [de-identified] : narrow-based gait with cane

## 2024-06-12 NOTE — ASSESSMENT
[FreeTextEntry1] : Osteoporosis. She has a history of vertebral fracture. She has received denosumab from 2022 to present. 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 We discussed the potential benefits and risks of antiresorptive osteoporosis therapy, including but not limited to osteonecrosis of the jaw and atypical femoral fracture. She is tolerating denosumab and we will continue. We discussed that denosumab must be dosed every 6 months due to rebound increase in bone breakdown with abrupt discontinuation of therapy, with transition to bisphosphonate therapy prior to a "drug holiday." We reviewed the risk of hypocalcemia with denosumab in the setting of chronic kidney disease. I advised additional calcium for the two weeks following denosumab injection.  Continue denosumab 60 mg SC every 6 months; dose administered today Calcium 4672-9570 mg daily from diet and supplements (to be taken in divided doses as no more than 500-600 mg can be absorbed at one time); continue current regimen Continue current vitamin D regimen pending level Diet, exercise and fall prevention discussed  Subclinical hyperthyroidism. She has had persistently low TSH values in the setting of thyroid nodules and negative TSH receptor antibody and thyroid stimulating immunoglobulin levels. Her TSH value was less than 0.1 uIU/mL and she met criteria for therapy. She is tolerating methimazole and we will continue pending results. We have discussed the potential risks and benefits of methimazole, including but not limited to rash, agranulocytosis, and liver failure. The patient has been advised to call me immediately with any concerning symptoms, including rash, fever, sore throat, right upper quadrant pain, jaundice, scleral icterus. Continue methimazole 2.5 mg daily pending thyroid function tests  Thyroid nodules. We discussed that approximately 95 percent of all thyroid nodules are caused by benign conditions. We discussed that thyroid nodules are very common and less likely to be cancer in older individuals. We discussed the risks and benefits of evaluation for thyroid cancer and morbidity of treatment if diagnosed versus overall prognosis of thyroid cancer. We will defer further thyroid ultrasound screening.  Next bone density testin months Next appointment: 6 months or earlier as needed  GranddaughterLeyla, [phone]  I reviewed the DXA performed on October 3, 2022 with the patient today. I reviewed the laboratories performed from 2023 to present with the patient today. I counseled the patient regarding calcium and vitamin D intake today. I discussed the following osteoporosis therapies: Denosumab  CC: Dr. Vel Lanier, Fax [phone]

## 2024-08-07 ENCOUNTER — APPOINTMENT (OUTPATIENT)
Dept: NEPHROLOGY | Facility: CLINIC | Age: 89
End: 2024-08-07

## 2024-08-07 ENCOUNTER — LABORATORY RESULT (OUTPATIENT)
Age: 89
End: 2024-08-07

## 2024-08-07 PROCEDURE — 99214 OFFICE O/P EST MOD 30 MIN: CPT

## 2024-08-07 PROCEDURE — G2211 COMPLEX E/M VISIT ADD ON: CPT

## 2024-08-07 PROCEDURE — 36415 COLL VENOUS BLD VENIPUNCTURE: CPT

## 2024-08-07 NOTE — HISTORY OF PRESENT ILLNESS
[FreeTextEntry1] : Kindly referred by Dr. Lanier for CKD  * BP controlled.  - 130 at home. No SOB with exertion. No CP. No lightheadedness. Compliant with medications. * L kidney atrophic. * CKD previously stable, creatinine 1.5.   Previous history (06May24): * eGFR 33, creatinine 1.5 * BP controlled.  - 120s at home. White coat effect. No lightheadedness. No CP/SOB. Compliant with medications. * L kidney atrophic.  Previous history (08Apr24): * CKD stable, creatinine 1.76. Atrophic kidneys. No albuminuria.  * BP controlled.  - 120s at home. No lightheadedness. No CP/SOB. Compliant with medications. *   Previous history (30Oct23): * CKD previously stable, 2.09 in 8/18/23 (Previously 1.6 in 2/17/23). * On HCTZ 12.5 mg and lisinopril 40. * BP controlled.  - 120s at home. No lightheadedness. No CP/SOB. Compliant with medications. *   Previous history (25Jul23):  CKD stable, creatinine 1.4. * eGFR 32 ([id number]). * HTN borderline uncontrolled. BP 120s - 130s at home. No lightheadedness. No CP/SOB.Compliant with medications.  * Ultrasound showedd R 8.8 and L atrophic and not well visualized.   Previous history (07Mar23): * CKD stable, creaitnine 1.41 in January 2023. * HTN controlled.  - 140s at home. No lightheadedness. No CP/SOB. Compliant with medications. * Ultrasound showedd R 8.8 and L atrophic and not well visualized.   Previous history (07Dec22): * CKD stable, creatinine 1.41. * HTN controlled. /60 - 80 at home. No lightheadedness. No CP/SOB. Compliant with medications.   Previous history (06Oct22): Labs from [facility] reviewed. Creatinine 1.27 on 9/15 (eGFR 40). Prevously creatinine has been 1.2 - 1.3 since 2021. * Ultrasound showedd R 8.8 and L atrophic and not well visualized. 12 mg albuminuria and no hematuria on U/A.  HTN treated with HCTZ 12.5 and lisinopril 40. * Doesn't check BP at home. No lightheadedness. No CP/SOB. Compliant with medications.

## 2024-08-07 NOTE — ASSESSMENT
[FreeTextEntry1] : -- # CKD stage 3 c/w aging and nephrosclerosis, atrophic kidneys.  * Recheck labs.  * Absolute kidney benefits/risks of SGLT2i in this clinical situation are uncertain.  * Therapies for kidney disease: blood pressure control; proteinuria reduction with ARB/ACEi; other evidence-based therapies including exercise, a plant-based lower oxalate diet, and 400 mcg folic acid daily  * Cardiovascular disease prevention: counseling on healthy diet, physical activity, weight loss, alcohol limitation, blood pressure control  * A counseling information sheet on CKD which they have been instructed to read has been given.  * The patient has been counseled that chronic kidney disease is a significant condition and regular office follow-up with me (at least every 3 months for now) is important for monitoring and their health, and that it is their responsibility to make a follow-up appointment.  * The patient has been counseled never to stop taking their medications without discussing it with me or another doctor.  * The patient has been counseled on avoiding NSAIDs.  * The patient has been counseled on risk of worsening kidney function and instructed to immediately call and speak with me and go immediately to ER with any severe symptoms, nausea, vomiting, diarrhea, chest pain, or shortness of breath.  # HTN controlled. * Cont HCTZ MWF, lisinopril. * The patient's blood pressure was checked with the [employer] [id number]-907XL using the SPRINT trial protocol after sitting quietly in an empty room with arm supported, back supported, and feet on the floor for 5 minutes. The average of 3 readings were taken. * A counseling information sheet on blood pressure and staying healthy has been given (which they have been instructed to read). * The patient has been counseled to check their BP at home with an automatic arm cuff, write down the readings, and reach me directly on the phone immediately if they are persistently > 180 systolic or if SBP is less than 100 or if lightheadedness develops. They were counseled to bring in all blood pressure readings and medications next visit. * The patient has been counseled that regular office follow-up (at least every 3 months for now) is important for monitoring and for their health, and that it is their responsibility to make follow up appointments. * The patient also has been counseled that they must never stop or change any medications without discussing this with me (or another physician).  # Osteoporosis.  * Follow up with Dr. Mcbride.   # Fluid overload improved.  * Low salt diet.

## 2024-09-26 ENCOUNTER — NON-APPOINTMENT (OUTPATIENT)
Age: 89
End: 2024-09-26

## 2024-11-12 ENCOUNTER — RX RENEWAL (OUTPATIENT)
Age: 89
End: 2024-11-12

## 2024-11-25 ENCOUNTER — RX RENEWAL (OUTPATIENT)
Age: 89
End: 2024-11-25

## 2024-12-12 ENCOUNTER — LABORATORY RESULT (OUTPATIENT)
Age: 88
End: 2024-12-12

## 2024-12-12 ENCOUNTER — APPOINTMENT (OUTPATIENT)
Dept: NEPHROLOGY | Facility: CLINIC | Age: 88
End: 2024-12-12
Payer: MEDICARE

## 2024-12-12 VITALS — DIASTOLIC BLOOD PRESSURE: 70 MMHG | SYSTOLIC BLOOD PRESSURE: 138 MMHG | HEART RATE: 78 BPM

## 2024-12-12 VITALS — DIASTOLIC BLOOD PRESSURE: 54 MMHG | HEART RATE: 72 BPM | SYSTOLIC BLOOD PRESSURE: 111 MMHG

## 2024-12-12 VITALS — WEIGHT: 96 LBS | BODY MASS INDEX: 21.52 KG/M2

## 2024-12-12 DIAGNOSIS — E87.70 FLUID OVERLOAD, UNSPECIFIED: ICD-10-CM

## 2024-12-12 DIAGNOSIS — I10 ESSENTIAL (PRIMARY) HYPERTENSION: ICD-10-CM

## 2024-12-12 DIAGNOSIS — N18.30 CHRONIC KIDNEY DISEASE, STAGE 3 UNSPECIFIED: ICD-10-CM

## 2024-12-12 PROCEDURE — G2211 COMPLEX E/M VISIT ADD ON: CPT

## 2024-12-12 PROCEDURE — 36415 COLL VENOUS BLD VENIPUNCTURE: CPT

## 2024-12-12 PROCEDURE — 99214 OFFICE O/P EST MOD 30 MIN: CPT

## 2024-12-14 LAB
ALBUMIN SERPL ELPH-MCNC: 4.3 G/DL
ALP BLD-CCNC: 48 U/L
ALT SERPL-CCNC: 15 U/L
ANION GAP SERPL CALC-SCNC: 15 MMOL/L
APPEARANCE: CLEAR
AST SERPL-CCNC: 21 U/L
BASOPHILS # BLD AUTO: 0.07 K/UL
BASOPHILS NFR BLD AUTO: 0.9 %
BILIRUB SERPL-MCNC: 0.2 MG/DL
BILIRUBIN URINE: NEGATIVE
BLOOD URINE: NEGATIVE
BUN SERPL-MCNC: 35 MG/DL
CALCIUM SERPL-MCNC: 10.2 MG/DL
CHLORIDE SERPL-SCNC: 108 MMOL/L
CO2 SERPL-SCNC: 21 MMOL/L
COLOR: YELLOW
CREAT SERPL-MCNC: 1.9 MG/DL
CREAT SPEC-SCNC: 171 MG/DL
CYSTATIN C SERPL-MCNC: 2.16 MG/L
EGFR: 25 ML/MIN/1.73M2
EOSINOPHIL # BLD AUTO: 0.15 K/UL
EOSINOPHIL NFR BLD AUTO: 1.9 %
GFR/BSA.PRED SERPLBLD CYS-BASED-ARV: 23 ML/MIN/1.73M2
GLUCOSE QUALITATIVE U: NEGATIVE MG/DL
GLUCOSE SERPL-MCNC: 99 MG/DL
HCT VFR BLD CALC: 37.9 %
HGB BLD-MCNC: 11.3 G/DL
IMM GRANULOCYTES NFR BLD AUTO: 0.3 %
KETONES URINE: ABNORMAL MG/DL
LEUKOCYTE ESTERASE URINE: ABNORMAL
LYMPHOCYTES # BLD AUTO: 2.72 K/UL
LYMPHOCYTES NFR BLD AUTO: 35.2 %
MAGNESIUM SERPL-MCNC: 2.1 MG/DL
MAN DIFF?: NORMAL
MCHC RBC-ENTMCNC: 29.7 PG
MCHC RBC-ENTMCNC: 29.8 G/DL
MCV RBC AUTO: 99.5 FL
MICROALBUMIN 24H UR DL<=1MG/L-MCNC: 2.1 MG/DL
MICROALBUMIN/CREAT 24H UR-RTO: 12 MG/G
MONOCYTES # BLD AUTO: 0.73 K/UL
MONOCYTES NFR BLD AUTO: 9.5 %
NEUTROPHILS # BLD AUTO: 4.03 K/UL
NEUTROPHILS NFR BLD AUTO: 52.2 %
NITRITE URINE: NEGATIVE
PH URINE: 5.5
PHOSPHATE SERPL-MCNC: 4.3 MG/DL
PLATELET # BLD AUTO: 188 K/UL
POTASSIUM SERPL-SCNC: 4.6 MMOL/L
PROT SERPL-MCNC: 7.7 G/DL
PROTEIN URINE: NEGATIVE MG/DL
RBC # BLD: 3.81 M/UL
RBC # FLD: 13.9 %
SODIUM SERPL-SCNC: 144 MMOL/L
SPECIFIC GRAVITY URINE: 1.02
TSH SERPL-ACNC: 0.06 UIU/ML
UROBILINOGEN URINE: 0.2 MG/DL
WBC # FLD AUTO: 7.72 K/UL

## 2024-12-18 ENCOUNTER — APPOINTMENT (OUTPATIENT)
Dept: ENDOCRINOLOGY | Facility: CLINIC | Age: 88
End: 2024-12-18

## 2024-12-18 VITALS
HEART RATE: 91 BPM | DIASTOLIC BLOOD PRESSURE: 74 MMHG | BODY MASS INDEX: 21.75 KG/M2 | WEIGHT: 97 LBS | SYSTOLIC BLOOD PRESSURE: 130 MMHG

## 2024-12-18 DIAGNOSIS — M81.0 AGE-RELATED OSTEOPOROSIS W/OUT CURRENT PATHOLOGICAL FRACTURE: ICD-10-CM

## 2024-12-18 DIAGNOSIS — E04.1 NONTOXIC SINGLE THYROID NODULE: ICD-10-CM

## 2024-12-18 DIAGNOSIS — E05.90 THYROTOXICOSIS, UNSPECIFIED W/OUT THYROTOXIC CRISIS OR STORM: ICD-10-CM

## 2024-12-18 PROCEDURE — 99214 OFFICE O/P EST MOD 30 MIN: CPT | Mod: 25

## 2024-12-18 PROCEDURE — 96401 CHEMO ANTI-NEOPL SQ/IM: CPT

## 2024-12-18 RX ORDER — DENOSUMAB 60 MG/ML
60 INJECTION SUBCUTANEOUS
Qty: 1 | Refills: 0 | Status: COMPLETED | OUTPATIENT
Start: 2024-12-18

## 2024-12-18 RX ADMIN — DENOSUMAB 60 MG/ML: 60 INJECTION SUBCUTANEOUS at 00:00

## 2025-04-14 DIAGNOSIS — N18.30 CHRONIC KIDNEY DISEASE, STAGE 3 UNSPECIFIED: ICD-10-CM

## 2025-04-17 ENCOUNTER — APPOINTMENT (OUTPATIENT)
Dept: NEPHROLOGY | Facility: CLINIC | Age: 89
End: 2025-04-17
Payer: MEDICARE

## 2025-04-17 ENCOUNTER — NON-APPOINTMENT (OUTPATIENT)
Age: 89
End: 2025-04-17

## 2025-04-17 VITALS — HEART RATE: 73 BPM | DIASTOLIC BLOOD PRESSURE: 61 MMHG | SYSTOLIC BLOOD PRESSURE: 137 MMHG

## 2025-04-17 DIAGNOSIS — I10 ESSENTIAL (PRIMARY) HYPERTENSION: ICD-10-CM

## 2025-04-17 DIAGNOSIS — E55.9 VITAMIN D DEFICIENCY, UNSPECIFIED: ICD-10-CM

## 2025-04-17 DIAGNOSIS — N18.4 CHRONIC KIDNEY DISEASE, STAGE 4 (SEVERE): ICD-10-CM

## 2025-04-17 DIAGNOSIS — R68.89 OTHER GENERAL SYMPTOMS AND SIGNS: ICD-10-CM

## 2025-04-17 DIAGNOSIS — Z79.899 OTHER LONG TERM (CURRENT) DRUG THERAPY: ICD-10-CM

## 2025-04-17 DIAGNOSIS — R79.9 ABNORMAL FINDING OF BLOOD CHEMISTRY, UNSPECIFIED: ICD-10-CM

## 2025-04-17 PROCEDURE — 36415 COLL VENOUS BLD VENIPUNCTURE: CPT

## 2025-04-17 PROCEDURE — 99214 OFFICE O/P EST MOD 30 MIN: CPT

## 2025-04-17 PROCEDURE — G2211 COMPLEX E/M VISIT ADD ON: CPT

## 2025-04-22 ENCOUNTER — RX RENEWAL (OUTPATIENT)
Age: 89
End: 2025-04-22

## 2025-04-27 LAB
25(OH)D3 SERPL-MCNC: 91.4 NG/ML
ALBUMIN SERPL ELPH-MCNC: 4.2 G/DL
ALP BLD-CCNC: 46 U/L
ALT SERPL-CCNC: 20 U/L
ANION GAP SERPL CALC-SCNC: 15 MMOL/L
AST SERPL-CCNC: 27 U/L
BASOPHILS # BLD AUTO: 0.07 K/UL
BASOPHILS NFR BLD AUTO: 0.9 %
BILIRUB SERPL-MCNC: 0.3 MG/DL
BUN SERPL-MCNC: 32 MG/DL
CALCIUM SERPL-MCNC: 9.9 MG/DL
CALCIUM SERPL-MCNC: 9.9 MG/DL
CHLORIDE SERPL-SCNC: 108 MMOL/L
CO2 SERPL-SCNC: 22 MMOL/L
CREAT SERPL-MCNC: 1.51 MG/DL
CREAT SPEC-SCNC: 121 MG/DL
EGFRCR SERPLBLD CKD-EPI 2021: 32 ML/MIN/1.73M2
EOSINOPHIL # BLD AUTO: 0.13 K/UL
EOSINOPHIL NFR BLD AUTO: 1.6 %
GLUCOSE SERPL-MCNC: 84 MG/DL
HCT VFR BLD CALC: 37.1 %
HGB BLD-MCNC: 11.5 G/DL
IMM GRANULOCYTES NFR BLD AUTO: 0.3 %
LYMPHOCYTES # BLD AUTO: 2.03 K/UL
LYMPHOCYTES NFR BLD AUTO: 25.6 %
MAN DIFF?: NORMAL
MCHC RBC-ENTMCNC: 29.9 PG
MCHC RBC-ENTMCNC: 31 G/DL
MCV RBC AUTO: 96.6 FL
MICROALBUMIN 24H UR DL<=1MG/L-MCNC: 3.3 MG/DL
MICROALBUMIN/CREAT 24H UR-RTO: 28 MG/G
MONOCYTES # BLD AUTO: 0.6 K/UL
MONOCYTES NFR BLD AUTO: 7.6 %
NEUTROPHILS # BLD AUTO: 5.08 K/UL
NEUTROPHILS NFR BLD AUTO: 64 %
PARATHYROID HORMONE INTACT: 59 PG/ML
PHOSPHATE SERPL-MCNC: 3.7 MG/DL
PLATELET # BLD AUTO: 178 K/UL
POTASSIUM SERPL-SCNC: 5 MMOL/L
PROT SERPL-MCNC: 7.6 G/DL
RBC # BLD: 3.84 M/UL
RBC # FLD: 14.4 %
SODIUM SERPL-SCNC: 145 MMOL/L
TSH SERPL-ACNC: 3.52 UIU/ML
WBC # FLD AUTO: 7.93 K/UL

## 2025-06-18 ENCOUNTER — APPOINTMENT (OUTPATIENT)
Dept: INFUSION THERAPY | Facility: CLINIC | Age: 89
End: 2025-06-18

## 2025-06-18 ENCOUNTER — OUTPATIENT (OUTPATIENT)
Dept: OUTPATIENT SERVICES | Facility: HOSPITAL | Age: 89
LOS: 1 days | End: 2025-06-18
Payer: MEDICARE

## 2025-06-18 VITALS
WEIGHT: 95.02 LBS | RESPIRATION RATE: 18 BRPM | DIASTOLIC BLOOD PRESSURE: 72 MMHG | HEIGHT: 55 IN | TEMPERATURE: 98 F | HEART RATE: 84 BPM | OXYGEN SATURATION: 96 % | SYSTOLIC BLOOD PRESSURE: 138 MMHG

## 2025-06-18 DIAGNOSIS — M81.0 AGE-RELATED OSTEOPOROSIS WITHOUT CURRENT PATHOLOGICAL FRACTURE: ICD-10-CM

## 2025-06-18 PROCEDURE — 96372 THER/PROPH/DIAG INJ SC/IM: CPT

## 2025-06-18 RX ORDER — DENOSUMAB 60 MG/ML
60 INJECTION SUBCUTANEOUS ONCE
Refills: 0 | Status: COMPLETED | OUTPATIENT
Start: 2025-06-18 | End: 2025-06-18

## 2025-06-18 RX ADMIN — DENOSUMAB 60 MILLIGRAM(S): 60 INJECTION SUBCUTANEOUS at 15:36

## 2025-06-20 ENCOUNTER — APPOINTMENT (OUTPATIENT)
Dept: ENDOCRINOLOGY | Facility: CLINIC | Age: 89
End: 2025-06-20
Payer: MEDICARE

## 2025-06-20 VITALS
BODY MASS INDEX: 21.3 KG/M2 | HEART RATE: 73 BPM | DIASTOLIC BLOOD PRESSURE: 73 MMHG | WEIGHT: 95 LBS | SYSTOLIC BLOOD PRESSURE: 147 MMHG

## 2025-06-20 PROCEDURE — G2211 COMPLEX E/M VISIT ADD ON: CPT

## 2025-06-20 PROCEDURE — 99214 OFFICE O/P EST MOD 30 MIN: CPT

## 2025-07-11 ENCOUNTER — RX RENEWAL (OUTPATIENT)
Age: 89
End: 2025-07-11

## 2025-08-18 ENCOUNTER — LABORATORY RESULT (OUTPATIENT)
Age: 89
End: 2025-08-18

## 2025-08-18 ENCOUNTER — APPOINTMENT (OUTPATIENT)
Dept: NEPHROLOGY | Facility: CLINIC | Age: 89
End: 2025-08-18
Payer: MEDICARE

## 2025-08-18 VITALS — DIASTOLIC BLOOD PRESSURE: 61 MMHG | HEART RATE: 64 BPM | SYSTOLIC BLOOD PRESSURE: 145 MMHG

## 2025-08-18 DIAGNOSIS — E55.9 VITAMIN D DEFICIENCY, UNSPECIFIED: ICD-10-CM

## 2025-08-18 DIAGNOSIS — N18.4 CHRONIC KIDNEY DISEASE, STAGE 4 (SEVERE): ICD-10-CM

## 2025-08-18 DIAGNOSIS — I10 ESSENTIAL (PRIMARY) HYPERTENSION: ICD-10-CM

## 2025-08-18 DIAGNOSIS — E21.3 HYPERPARATHYROIDISM, UNSPECIFIED: ICD-10-CM

## 2025-08-18 PROCEDURE — 36415 COLL VENOUS BLD VENIPUNCTURE: CPT

## 2025-08-18 PROCEDURE — 99214 OFFICE O/P EST MOD 30 MIN: CPT

## 2025-08-18 PROCEDURE — G2211 COMPLEX E/M VISIT ADD ON: CPT

## 2025-08-20 LAB
25(OH)D3 SERPL-MCNC: 75.5 NG/ML
ALBUMIN SERPL ELPH-MCNC: 4.3 G/DL
ALBUMIN, RANDOM URINE: 1.4 MG/DL
ALP BLD-CCNC: 55 U/L
ALT SERPL-CCNC: 27 U/L
ANION GAP SERPL CALC-SCNC: 21 MMOL/L
APPEARANCE: CLEAR
AST SERPL-CCNC: 34 U/L
BILIRUB SERPL-MCNC: 0.2 MG/DL
BILIRUBIN URINE: NEGATIVE
BLOOD URINE: NEGATIVE
BUN SERPL-MCNC: 40 MG/DL
CALCIUM SERPL-MCNC: 10 MG/DL
CALCIUM SERPL-MCNC: 10 MG/DL
CHLORIDE SERPL-SCNC: 110 MMOL/L
CHOLEST SERPL-MCNC: 150 MG/DL
CO2 SERPL-SCNC: 13 MMOL/L
COLOR: YELLOW
CREAT SERPL-MCNC: 1.54 MG/DL
CREAT SERPL-MCNC: 1.54 MG/DL
CREAT SPEC-SCNC: 42 MG/DL
CREAT SPEC-SCNC: 42 MG/DL
CREAT/PROT UR: 0.2 RATIO
CYSTATIN C SERPL-MCNC: 1.92 MG/L
EGFRCR SERPLBLD CKD-EPI 2021: 31 ML/MIN/1.73M2
EGFRCR SERPLBLD CKD-EPI 2021: 31 ML/MIN/1.73M2
EGFRCR-CYS SERPLBLD CKD-EPI 2021: 30 ML/MIN/1.73M2
ESTIMATED AVERAGE GLUCOSE: 114 MG/DL
FERRITIN SERPL-MCNC: 124 NG/ML
GFR/BSA.PRED SERPLBLD CYS-BASED-ARV: 27 ML/MIN/1.73M2
GLUCOSE QUALITATIVE U: NEGATIVE MG/DL
GLUCOSE SERPL-MCNC: 116 MG/DL
HBA1C MFR BLD HPLC: 5.6 %
HDLC SERPL-MCNC: 59 MG/DL
KETONES URINE: NEGATIVE MG/DL
LDLC SERPL-MCNC: 77 MG/DL
LEUKOCYTE ESTERASE URINE: ABNORMAL
MAGNESIUM SERPL-MCNC: 2.2 MG/DL
MICROALBUMIN/CREAT 24H UR-RTO: 33 MG/G
NITRITE URINE: NEGATIVE
NONHDLC SERPL-MCNC: 91 MG/DL
PARATHYROID HORMONE INTACT: 59 PG/ML
PH URINE: 6
PHOSPHATE SERPL-MCNC: 3.4 MG/DL
POTASSIUM SERPL-SCNC: 4.4 MMOL/L
PROT SERPL-MCNC: 8 G/DL
PROT UR-MCNC: 6 MG/DL
PROTEIN URINE: NEGATIVE MG/DL
SODIUM SERPL-SCNC: 144 MMOL/L
SPECIFIC GRAVITY URINE: 1.01
TRIGL SERPL-MCNC: 68 MG/DL
TSH SERPL-ACNC: 0.77 UIU/ML
UROBILINOGEN URINE: 0.2 MG/DL
VIT B12 SERPL-MCNC: 502 PG/ML

## 2025-08-20 RX ORDER — SODIUM BICARBONATE 650 MG/1
650 TABLET ORAL
Qty: 60 | Refills: 5 | Status: ACTIVE | COMMUNITY
Start: 2025-08-20 | End: 1900-01-01